# Patient Record
Sex: FEMALE | Race: WHITE | Employment: FULL TIME | RURAL
[De-identification: names, ages, dates, MRNs, and addresses within clinical notes are randomized per-mention and may not be internally consistent; named-entity substitution may affect disease eponyms.]

---

## 2017-01-30 ENCOUNTER — OFFICE VISIT (OUTPATIENT)
Dept: FAMILY MEDICINE CLINIC | Age: 62
End: 2017-01-30

## 2017-01-30 VITALS
HEIGHT: 70 IN | WEIGHT: 161 LBS | HEART RATE: 67 BPM | OXYGEN SATURATION: 97 % | BODY MASS INDEX: 23.05 KG/M2 | DIASTOLIC BLOOD PRESSURE: 80 MMHG | TEMPERATURE: 97.5 F | SYSTOLIC BLOOD PRESSURE: 120 MMHG | RESPIRATION RATE: 16 BRPM

## 2017-01-30 DIAGNOSIS — J01.00 ACUTE NON-RECURRENT MAXILLARY SINUSITIS: Primary | ICD-10-CM

## 2017-01-30 DIAGNOSIS — Z13.31 SCREENING FOR DEPRESSION: ICD-10-CM

## 2017-01-30 RX ORDER — AMOXICILLIN 875 MG/1
875 TABLET, FILM COATED ORAL 2 TIMES DAILY
Qty: 20 TAB | Refills: 0 | Status: SHIPPED | OUTPATIENT
Start: 2017-01-30 | End: 2017-04-10

## 2017-01-30 RX ORDER — PAROXETINE 10 MG/1
TABLET, FILM COATED ORAL
Qty: 30 TAB | Refills: 3 | Status: SHIPPED | OUTPATIENT
Start: 2017-01-30 | End: 2017-05-08 | Stop reason: SDUPTHER

## 2017-01-30 NOTE — PROGRESS NOTES
HISTORY OF PRESENT ILLNESS  Yonas Hannon is a 59 y.o. female. Chief Complaint   Patient presents with    Headache     nasal congestion       HPI  Got worse   Nasal congestion  Itchy throat  Lungs ok  Constant pain in sinuses  Gums swollen and sinus pressure and toothache  headache    Review of Systems   Constitutional: Negative for fever. HENT: Positive for congestion, ear pain (bilat) and sore throat (scratchy). Respiratory: Positive for cough. Negative for sputum production. Cardiovascular: Negative for chest pain. Gastrointestinal: Positive for nausea. Negative for abdominal pain and vomiting. Neurological: Positive for headaches. Past Medical History   Diagnosis Date    Anxiety     Cataract     GERD (gastroesophageal reflux disease)     Glaucoma     HCVD (hypertensive cardiovascular disease)     Mixed hyperlipidemia     Osteoporosis 12/15    Paroxysmal atrial fibrillation (HCC)     RBBB (right bundle branch block)      Current Outpatient Prescriptions   Medication Sig Dispense Refill    amoxicillin (AMOXIL) 875 mg tablet Take 1 Tab by mouth two (2) times a day. 20 Tab 0    atorvastatin (LIPITOR) 10 mg tablet TAKE 1 TABLET BY MOUTH DAILY 30 Tab 5    omeprazole (PRILOSEC) 40 mg capsule Take 1 Cap by mouth daily. 30 Cap 5    PARoxetine (PAXIL) 10 mg tablet TAKE ONE TABLET BY MOUTH DAILY 30 Tab 5    latanoprost (XALATAN) 0.005 % ophthalmic solution       atenolol (TENORMIN) 50 mg tablet Take 1 Tab by mouth two (2) times a day. 60 Tab 12    multivitamin (ONE A DAY) tablet Take 1 Tab by mouth every other day.  Calcium Carbonate-Vit D3-Min (CALTRATE 600+D PLUS MINERALS) 600 mg calcium- 400 unit Tab Take  by mouth every other day.  aspirin 81 mg tablet Take 81 mg by mouth.  omega-3 fatty acids-vitamin e (FISH OIL) 1,000 mg cap Take 1 Cap by mouth daily. (Patient taking differently: Take 1,200 Caps by mouth daily.  Takes 2 daily) 90 Cap 3    cyclobenzaprine (FLEXERIL) 5 mg tablet Take 1 Tab by mouth three (3) times daily as needed for Muscle Spasm(s). 10 Tab 0    ibuprofen (MOTRIN) 200 mg tablet Take  by mouth. Allergies   Allergen Reactions    Ciprofloxacin Anaphylaxis    Betadine [Povidone-Iodine] Other (comments)     Burns her skin    Morphine Other (comments)     Delusions     Visit Vitals    /80 (BP 1 Location: Left arm, BP Patient Position: Sitting)    Pulse 67    Temp 97.5 °F (36.4 °C) (Oral)    Resp 16    Ht 5' 10\" (1.778 m)    Wt 161 lb (73 kg)    SpO2 97%    BMI 23.1 kg/m2       Physical Exam   Constitutional: She is oriented to person, place, and time. She appears well-developed and well-nourished. No distress. HENT:   Head: Normocephalic and atraumatic. Right Ear: External ear normal.   Left Ear: External ear normal.   Mouth/Throat: Oropharynx is clear and moist. No oropharyngeal exudate. Clear mucus in nose, facial tenderness over max sinus   Eyes: Conjunctivae and EOM are normal. Pupils are equal, round, and reactive to light. Cardiovascular: Normal rate and regular rhythm. Pulmonary/Chest: Effort normal and breath sounds normal.   Lymphadenopathy:     She has no cervical adenopathy. Neurological: She is alert and oriented to person, place, and time. Nursing note and vitals reviewed. ASSESSMENT and PLAN    ICD-10-CM ICD-9-CM    1. Acute non-recurrent maxillary sinusitis J01.00 461.0 amoxicillin (AMOXIL) 875 mg tablet   2.  Screening for depression Z13.89 V79.0

## 2017-03-07 ENCOUNTER — TELEPHONE (OUTPATIENT)
Dept: FAMILY MEDICINE CLINIC | Age: 62
End: 2017-03-07

## 2017-03-07 RX ORDER — VALACYCLOVIR HYDROCHLORIDE 1 G/1
TABLET, FILM COATED ORAL
Qty: 20 TAB | Refills: 1 | Status: SHIPPED | OUTPATIENT
Start: 2017-03-07 | End: 2021-03-01 | Stop reason: SDUPTHER

## 2017-03-23 ENCOUNTER — OFFICE VISIT (OUTPATIENT)
Dept: FAMILY MEDICINE CLINIC | Age: 62
End: 2017-03-23

## 2017-03-23 ENCOUNTER — CLINICAL SUPPORT (OUTPATIENT)
Dept: FAMILY MEDICINE CLINIC | Age: 62
End: 2017-03-23

## 2017-03-23 DIAGNOSIS — R68.89 FLU-LIKE SYMPTOMS: Primary | ICD-10-CM

## 2017-03-23 LAB
BINAX NOW INFLUENZA: POSITIVE
QUICKVUE INFLUENZA TEST: POSITIVE
VALID INTERNAL CONTROL?: YES
VALID INTERNAL CONTROL?: YES

## 2017-03-23 NOTE — PROGRESS NOTES
Call into [facility] Tamiflu 75 mg bid for 5 d for patient and Tamiflu 75 mg every day for 10 d for  Ld Alvina

## 2017-03-23 NOTE — PROGRESS NOTES
Per Dr. Anthony Rich, I have called in Tamiflu 75 mg to take BID for 5 days, # 10 called to Cipriano Saleh at the [address] [place] [address].   Sylvia Darling RN

## 2017-04-10 ENCOUNTER — OFFICE VISIT (OUTPATIENT)
Dept: FAMILY MEDICINE CLINIC | Age: 62
End: 2017-04-10

## 2017-04-10 VITALS
SYSTOLIC BLOOD PRESSURE: 132 MMHG | HEART RATE: 78 BPM | DIASTOLIC BLOOD PRESSURE: 88 MMHG | RESPIRATION RATE: 16 BRPM | BODY MASS INDEX: 22.33 KG/M2 | TEMPERATURE: 99.4 F | WEIGHT: 156 LBS | OXYGEN SATURATION: 98 % | HEIGHT: 70 IN

## 2017-04-10 DIAGNOSIS — R68.89 FLU-LIKE SYMPTOMS: ICD-10-CM

## 2017-04-10 DIAGNOSIS — J02.9 SORE THROAT: ICD-10-CM

## 2017-04-10 DIAGNOSIS — J01.01 ACUTE RECURRENT MAXILLARY SINUSITIS: Primary | ICD-10-CM

## 2017-04-10 LAB
BINAX NOW INFLUENZA: NEGATIVE
S PYO AG THROAT QL: NEGATIVE
VALID INTERNAL CONTROL?: YES
VALID INTERNAL CONTROL?: YES

## 2017-04-10 RX ORDER — FLUTICASONE PROPIONATE 50 MCG
SPRAY, SUSPENSION (ML) NASAL
Qty: 1 BOTTLE | Refills: 1 | Status: SHIPPED | OUTPATIENT
Start: 2017-04-10 | End: 2018-10-25 | Stop reason: SDUPTHER

## 2017-04-10 RX ORDER — AMOXICILLIN AND CLAVULANATE POTASSIUM 500; 125 MG/1; MG/1
1 TABLET, FILM COATED ORAL 2 TIMES DAILY
Qty: 20 TAB | Refills: 0 | Status: SHIPPED | OUTPATIENT
Start: 2017-04-10 | End: 2017-04-20

## 2017-04-10 NOTE — PROGRESS NOTES
Tavo Kim is a 59 y.o. female who presents to the office today with the following:  Chief Complaint   Patient presents with    Sore Throat     cough       Allergies   Allergen Reactions    Ciprofloxacin Anaphylaxis    Betadine [Povidone-Iodine] Other (comments)     Burns her skin    Morphine Other (comments)     Delusions       Current Outpatient Prescriptions   Medication Sig    amoxicillin-clavulanate (AUGMENTIN) 500-125 mg per tablet Take 1 Tab by mouth two (2) times a day for 10 days.  fluticasone (FLONASE) 50 mcg/actuation nasal spray 2 puffs each nostril daily    valACYclovir (VALTREX) 1 gram tablet Take 2 tablets bid for 1 d    PARoxetine (PAXIL) 10 mg tablet TAKE ONE TABLET BY MOUTH DAILY    atorvastatin (LIPITOR) 10 mg tablet TAKE 1 TABLET BY MOUTH DAILY    omeprazole (PRILOSEC) 40 mg capsule Take 1 Cap by mouth daily.  cyclobenzaprine (FLEXERIL) 5 mg tablet Take 1 Tab by mouth three (3) times daily as needed for Muscle Spasm(s).  ibuprofen (MOTRIN) 200 mg tablet Take  by mouth.  latanoprost (XALATAN) 0.005 % ophthalmic solution     atenolol (TENORMIN) 50 mg tablet Take 1 Tab by mouth two (2) times a day.  multivitamin (ONE A DAY) tablet Take 1 Tab by mouth every other day.  Calcium Carbonate-Vit D3-Min (CALTRATE 600+D PLUS MINERALS) 600 mg calcium- 400 unit Tab Take  by mouth every other day.  aspirin 81 mg tablet Take 81 mg by mouth.  omega-3 fatty acids-vitamin e (FISH OIL) 1,000 mg cap Take 1 Cap by mouth daily. (Patient taking differently: Take 1,200 Caps by mouth daily. Takes 2 daily)     No current facility-administered medications for this visit.         Past Medical History:   Diagnosis Date    Anxiety     Cataract     GERD (gastroesophageal reflux disease)     Glaucoma     HCVD (hypertensive cardiovascular disease)     Mixed hyperlipidemia     Osteoporosis 12/15    Paroxysmal atrial fibrillation (HCC)     RBBB (right bundle branch block) Past Surgical History:   Procedure Laterality Date    HX BLADDER REPAIR      HX CYST INCISION AND DRAINAGE      tumor/lt    HX GYN      hysterectomy       History   Smoking Status    Current Some Day Smoker    Types: Cigarettes   Smokeless Tobacco    Never Used       Family History   Problem Relation Age of Onset    Diabetes Mother     Heart Disease Father      chf    Cancer Sister      lung ca         History of Present Illness:  Patient here for evaluation sinus complaints  Last week she developed a scratchy throat. Symptoms began to improve. Now over the last 24 hours she complaining of nasal congestion with some maxillary sinus tenderness and upper teeth pain. Postnasal drip. She has a sore throat. Slight cough. Low-grade fever generalized myalgias. Symptoms feel very much like her previous sinusitis infections. She has had these in the past.  She has been seen by ENT in the past but it has been a number of years. She was on Flonase in the past which helped but she ran out. \  Patient does smoke. She has not smoked over the last week. I encouraged her not to start again. As noted she states her cough is mild at this point. Patient gets her routine medical care to Dr. Bernice Chaudhary. Blood pressure currently in good control.   Compliant with her other medications      Review of Systems:    Review of systems negative except as noted above      Physical Exam:  Visit Vitals    /88 (BP 1 Location: Right arm, BP Patient Position: Sitting)    Pulse 78    Temp 99.4 °F (37.4 °C) (Oral)    Resp 16    Ht 5' 10\" (1.778 m)    Wt 156 lb (70.8 kg)    SpO2 98%    BMI 22.38 kg/m2     Vitals:    04/10/17 0723   BP: 132/88   BP 1 Location: Right arm   BP Patient Position: Sitting   Pulse: 78   Resp: 16   Temp: 99.4 °F (37.4 °C)   TempSrc: Oral   SpO2: 98%   Weight: 156 lb (70.8 kg)   Height: 5' 10\" (1.778 m)    Patient no acute distress vitals as above  Head was normocephalic  External ears were normal.  Ear canals normal.  TMs were clear  Nose external ears normal.  No lesions. Minimal clear congestion      Reproducible tenderness maxillary sinuses right greater than left  OP Mucosa normal.  Pharynx normal.  No erythema or exudate. Structures midline  Neck no nodes no masses  Chest had a few coarse breath sounds which cleared with coughing no wheezes rhonchi or rales. Good air exchange  Cor regular rate and rhythm no murmurs      Assessment/Plan:  1. Acute recurrent maxillary sinusitis  We will treat with Augmentin and Flonase. She will follow-up if symptoms persist.  - amoxicillin-clavulanate (AUGMENTIN) 500-125 mg per tablet; Take 1 Tab by mouth two (2) times a day for 10 days. Dispense: 20 Tab; Refill: 0  - fluticasone (FLONASE) 50 mcg/actuation nasal spray; 2 puffs each nostril daily  Dispense: 1 Bottle; Refill: 1    2. Flu-like symptoms  Rapid strep negative  - AMB POC RAPID STREP A    3. Sore throat  Influenza test negative  - AMB POC BINAX NOW INFLUENZA TEST  Patient will otherwise continue her current medications and keep planned routine follow-up        Continue current therapy plan except for indicated above. Verbal and written instructions (see AVS) provided.  Patient expresses understanding of diagnosis and treatment plan. Follow-up Disposition:  Return if symptoms worsen or fail to improve. Richmond Casas.  Rissa Tong MD

## 2017-04-10 NOTE — PATIENT INSTRUCTIONS
Home, rest, lots of fluids, vaporizer if needed. Over the counter cold medications if needed. Follow up if worse or not better next 3-5 days.     Work on your smoking cessation  Notify Dr. Susan Fu if you require any help with this  You may call 1-800-QUIT NOW for additional help and to get nicotine patches

## 2017-05-08 RX ORDER — PAROXETINE 10 MG/1
TABLET, FILM COATED ORAL
Qty: 30 TAB | Refills: 3 | OUTPATIENT
Start: 2017-05-08

## 2017-05-08 RX ORDER — PAROXETINE 10 MG/1
TABLET, FILM COATED ORAL
Qty: 30 TAB | Refills: 1 | Status: SHIPPED | OUTPATIENT
Start: 2017-05-08 | End: 2017-08-09 | Stop reason: SDUPTHER

## 2017-05-26 ENCOUNTER — OFFICE VISIT (OUTPATIENT)
Dept: FAMILY MEDICINE CLINIC | Age: 62
End: 2017-05-26

## 2017-05-26 VITALS
WEIGHT: 152 LBS | HEART RATE: 61 BPM | DIASTOLIC BLOOD PRESSURE: 72 MMHG | OXYGEN SATURATION: 99 % | TEMPERATURE: 97.2 F | BODY MASS INDEX: 21.81 KG/M2 | SYSTOLIC BLOOD PRESSURE: 122 MMHG | RESPIRATION RATE: 18 BRPM

## 2017-05-26 DIAGNOSIS — N30.01 ACUTE CYSTITIS WITH HEMATURIA: Primary | ICD-10-CM

## 2017-05-26 DIAGNOSIS — R30.0 DYSURIA: ICD-10-CM

## 2017-05-26 LAB
BILIRUB UR QL STRIP: NEGATIVE
GLUCOSE UR-MCNC: NEGATIVE MG/DL
KETONES P FAST UR STRIP-MCNC: NEGATIVE MG/DL
PH UR STRIP: 7.5 [PH] (ref 4.6–8)
PROT UR QL STRIP: NORMAL MG/DL
SP GR UR STRIP: 1.01 (ref 1–1.03)
UA UROBILINOGEN AMB POC: NORMAL (ref 0.2–1)
URINALYSIS CLARITY POC: CLEAR
URINALYSIS COLOR POC: YELLOW
URINE BLOOD POC: NORMAL
URINE LEUKOCYTES POC: NORMAL
URINE NITRITES POC: POSITIVE

## 2017-05-26 RX ORDER — PHENAZOPYRIDINE HYDROCHLORIDE 200 MG/1
200 TABLET, FILM COATED ORAL
Qty: 10 TAB | Refills: 0 | Status: SHIPPED | OUTPATIENT
Start: 2017-05-26 | End: 2017-05-28

## 2017-05-26 RX ORDER — SULFAMETHOXAZOLE AND TRIMETHOPRIM 800; 160 MG/1; MG/1
1 TABLET ORAL 2 TIMES DAILY
Qty: 10 TAB | Refills: 0 | Status: SHIPPED | OUTPATIENT
Start: 2017-05-26 | End: 2017-05-31

## 2017-05-26 NOTE — PATIENT INSTRUCTIONS
Urinary Tract Infection in Women: Care Instructions  Your Care Instructions    A urinary tract infection, or UTI, is a general term for an infection anywhere between the kidneys and the urethra (where urine comes out). Most UTIs are bladder infections. They often cause pain or burning when you urinate. UTIs are caused by bacteria and can be cured with antibiotics. Be sure to complete your treatment so that the infection goes away. Follow-up care is a key part of your treatment and safety. Be sure to make and go to all appointments, and call your doctor if you are having problems. It's also a good idea to know your test results and keep a list of the medicines you take. How can you care for yourself at home? · Take your antibiotics as directed. Do not stop taking them just because you feel better. You need to take the full course of antibiotics. · Drink extra water and other fluids for the next day or two. This may help wash out the bacteria that are causing the infection. (If you have kidney, heart, or liver disease and have to limit fluids, talk with your doctor before you increase your fluid intake.)  · Avoid drinks that are carbonated or have caffeine. They can irritate the bladder. · Urinate often. Try to empty your bladder each time. · To relieve pain, take a hot bath or lay a heating pad set on low over your lower belly or genital area. Never go to sleep with a heating pad in place. To prevent UTIs  · Drink plenty of water each day. This helps you urinate often, which clears bacteria from your system. (If you have kidney, heart, or liver disease and have to limit fluids, talk with your doctor before you increase your fluid intake.)  · Urinate when you need to. · Urinate right after you have sex. · Change sanitary pads often. · Avoid douches, bubble baths, feminine hygiene sprays, and other feminine hygiene products that have deodorants.   · After going to the bathroom, wipe from front to back.  When should you call for help? Call your doctor now or seek immediate medical care if:  · Symptoms such as fever, chills, nausea, or vomiting get worse or appear for the first time. · You have new pain in your back just below your rib cage. This is called flank pain. · There is new blood or pus in your urine. · You have any problems with your antibiotic medicine. Watch closely for changes in your health, and be sure to contact your doctor if:  · You are not getting better after taking an antibiotic for 2 days. · Your symptoms go away but then come back. Where can you learn more? Go to http://alex-vamsi.info/. Enter S107 in the search box to learn more about \"Urinary Tract Infection in Women: Care Instructions. \"  Current as of: November 28, 2016  Content Version: 11.2  © 5283-9561 [employer], [employer]. Care instructions adapted under license by [employer] (which disclaims liability or warranty for this information). If you have questions about a medical condition or this instruction, always ask your healthcare professional. [address] any warranty or liability for your use of this information.

## 2017-05-26 NOTE — PROGRESS NOTES
Karol Cuellar is a 57 y.o. female who presents to the office today with the following:  Chief Complaint   Patient presents with    Bladder Infection     s/s uti, hx uti       HPI  Pt c/o UTI sxs x 1 day. Started burning yesterday, also with urgency/frequency. Urine appears slightly cloudy, but no blood. Otherwise feeling well. Denies f/c, n/v/d, abd pain, back pain. Review of Systems   Constitutional: Negative for chills, fever and malaise/fatigue. Respiratory: Negative for shortness of breath. Cardiovascular: Negative for chest pain. Gastrointestinal: Negative. Negative for abdominal pain, diarrhea, nausea and vomiting. Genitourinary: Positive for dysuria, frequency and urgency. Negative for flank pain and hematuria. Musculoskeletal: Negative for myalgias. Skin: Negative for rash. See HPI. Allergies   Allergen Reactions    Ciprofloxacin Anaphylaxis    Betadine [Povidone-Iodine] Other (comments)     Burns her skin    Morphine Other (comments)     Delusions       Current Outpatient Prescriptions   Medication Sig    trimethoprim-sulfamethoxazole (BACTRIM DS, SEPTRA DS) 160-800 mg per tablet Take 1 Tab by mouth two (2) times a day for 5 days.  phenazopyridine (PYRIDIUM) 200 mg tablet Take 1 Tab by mouth three (3) times daily as needed for Pain for up to 2 days.  PARoxetine (PAXIL) 10 mg tablet TAKE ONE TABLET BY MOUTH DAILY    atorvastatin (LIPITOR) 10 mg tablet TAKE 1 TABLET BY MOUTH DAILY    omeprazole (PRILOSEC) 40 mg capsule Take 1 Cap by mouth daily.  ibuprofen (MOTRIN) 200 mg tablet Take  by mouth.  latanoprost (XALATAN) 0.005 % ophthalmic solution     atenolol (TENORMIN) 50 mg tablet Take 1 Tab by mouth two (2) times a day.  multivitamin (ONE A DAY) tablet Take 1 Tab by mouth every other day.  Calcium Carbonate-Vit D3-Min (CALTRATE 600+D PLUS MINERALS) 600 mg calcium- 400 unit Tab Take  by mouth every other day.     aspirin 81 mg tablet Take 81 mg by mouth.    omega-3 fatty acids-vitamin e (FISH OIL) 1,000 mg cap Take 1 Cap by mouth daily. (Patient taking differently: Take 1,200 Caps by mouth daily. Takes 2 daily)    fluticasone (FLONASE) 50 mcg/actuation nasal spray 2 puffs each nostril daily    valACYclovir (VALTREX) 1 gram tablet Take 2 tablets bid for 1 d    cyclobenzaprine (FLEXERIL) 5 mg tablet Take 1 Tab by mouth three (3) times daily as needed for Muscle Spasm(s). No current facility-administered medications for this visit. Past Medical History:   Diagnosis Date    Anxiety     Cataract     GERD (gastroesophageal reflux disease)     Glaucoma     HCVD (hypertensive cardiovascular disease)     Mixed hyperlipidemia     Osteoporosis 12/15    Paroxysmal atrial fibrillation (HCC)     RBBB (right bundle branch block)        Past Surgical History:   Procedure Laterality Date    HX BLADDER REPAIR      HX CYST INCISION AND DRAINAGE      tumor/lt    HX GYN      hysterectomy       Social History     Social History    Marital status:      Spouse name: N/A    Number of children: N/A    Years of education: N/A     Social History Main Topics    Smoking status: Current Some Day Smoker     Types: Cigarettes    Smokeless tobacco: Never Used    Alcohol use Yes      Comment: Occasional    Drug use: No    Sexual activity: Yes     Partners: Male     Other Topics Concern    None     Social History Narrative       Family History   Problem Relation Age of Onset    Diabetes Mother     Heart Disease Father      chf    Cancer Sister      lung ca         Physical Exam:  Visit Vitals    /72 (BP 1 Location: Left arm, BP Patient Position: Sitting)    Pulse 61    Temp 97.2 °F (36.2 °C) (Oral)    Resp 18    Wt 152 lb (68.9 kg)    SpO2 99%    BMI 21.81 kg/m2     Physical Exam   Constitutional: She is oriented to person, place, and time and well-developed, well-nourished, and in no distress.    HENT:   Head: Normocephalic and atraumatic. Eyes: Conjunctivae are normal.   Neck: Normal range of motion. Neck supple. Cardiovascular: Normal rate and regular rhythm. Pulmonary/Chest: Effort normal and breath sounds normal.   Abdominal: Soft. Normal appearance and bowel sounds are normal. She exhibits no distension and no mass. There is no hepatosplenomegaly. There is no tenderness. There is no rigidity, no rebound, no guarding, no CVA tenderness, no tenderness at McBurney's point and negative Croft's sign. Neurological: She is alert and oriented to person, place, and time. Gait normal.   Skin: Skin is warm and dry. Psychiatric: Mood and affect normal.   Nursing note and vitals reviewed. Assessment/Plan:    ICD-10-CM ICD-9-CM    1. Acute cystitis with hematuria N30.01 595.0 CULTURE, URINE      trimethoprim-sulfamethoxazole (BACTRIM DS, SEPTRA DS) 160-800 mg per tablet      phenazopyridine (PYRIDIUM) 200 mg tablet   2. Dysuria R30.0 788.1 AMB POC URINALYSIS DIP STICK MANUAL W/O MICRO      phenazopyridine (PYRIDIUM) 200 mg tablet     Results for orders placed or performed in visit on 05/26/17   AMB POC URINALYSIS DIP STICK MANUAL W/O MICRO   Result Value Ref Range    Color (UA POC) Yellow     Clarity (UA POC) Clear     Glucose (UA POC) Negative Negative    Bilirubin (UA POC) Negative Negative    Ketones (UA POC) Negative Negative    Specific gravity (UA POC) 1.015 1.001 - 1.035    Blood (UA POC) Trace Negative    pH (UA POC) 7.5 4.6 - 8.0    Protein (UA POC) Trace Negative mg/dL    Urobilinogen (UA POC) 0.2 mg/dL 0.2 - 1    Nitrites (UA POC) Positive Negative    Leukocyte esterase (UA POC) 1+ Negative     Instructed to push fluids (ie water/cranberry juice). Avoid caffeine and carbonated bvgs. Counseled pt on potential medication AEs/interactions. Counseled on otc medications for sxs relief- prefers Rx, but if too expensive may try AZO instead. To limit use 2-3 d only.   Educational handout provided with home care instructions. Instructed to RTO/seek medical attn if sxs persist/worsen. Follow-up Disposition:  Return if symptoms worsen or fail to improve.     Helga Anne PA-C

## 2017-05-26 NOTE — PROGRESS NOTES
Chief Complaint   Patient presents with    Bladder Infection     s/s uti, hx uti     Health Maintenance Due   Topic Date Due    Pneumococcal 19-64 Highest Risk (1 of 3 - PCV13) 05/18/1972 pt. declines    DTaP/Tdap/Td series (1 - Tdap) 05/18/1974     PAP AKA CERVICAL CYTOLOGY  05/18/1974 sees Dr. Siobhan Jacobs FOBT Q 1 YEAR AGE 50-75  05/18/2003 declines    ZOSTER VACCINE AGE 60>  05/18/2013 aware     Blood pressure 122/72, pulse 61, temperature 97.2 °F (36.2 °C), temperature source Oral, resp. rate 18, weight 152 lb (68.9 kg), SpO2 99 %.     Radha Beverage, LPN

## 2017-05-29 LAB — BACTERIA UR CULT: ABNORMAL

## 2017-05-30 NOTE — PROGRESS NOTES
Pt notified, Bactrim should work. Sxs improved but thinks now has a yeast infection with some itching. Will try some otc remedies and notify if no improvement and may send some Diflucan.

## 2017-06-02 ENCOUNTER — TELEPHONE (OUTPATIENT)
Dept: FAMILY MEDICINE CLINIC | Age: 62
End: 2017-06-02

## 2017-06-02 DIAGNOSIS — N39.0 URINARY TRACT INFECTION WITH HEMATURIA, SITE UNSPECIFIED: Primary | ICD-10-CM

## 2017-06-02 DIAGNOSIS — R31.9 URINARY TRACT INFECTION WITH HEMATURIA, SITE UNSPECIFIED: Primary | ICD-10-CM

## 2017-06-02 RX ORDER — SULFAMETHOXAZOLE AND TRIMETHOPRIM 800; 160 MG/1; MG/1
1 TABLET ORAL 2 TIMES DAILY
Qty: 10 TAB | Refills: 0 | Status: SHIPPED | OUTPATIENT
Start: 2017-06-02 | End: 2017-06-07

## 2017-06-02 NOTE — TELEPHONE ENCOUNTER
Pt says thought sxs were better so stopped abx after 3 days. Had nurse check her urine and had trace amounts of leuk still. Would like refill called in, otherwise is asxs.

## 2017-06-09 ENCOUNTER — CLINICAL SUPPORT (OUTPATIENT)
Dept: FAMILY MEDICINE CLINIC | Age: 62
End: 2017-06-09

## 2017-06-09 DIAGNOSIS — R82.90 ABNORMAL URINE FINDING: Primary | ICD-10-CM

## 2017-06-09 DIAGNOSIS — Z87.440 H/O URINARY INFECTION: ICD-10-CM

## 2017-06-10 LAB
APPEARANCE UR: CLEAR
BACTERIA #/AREA URNS HPF: ABNORMAL /[HPF]
BILIRUB UR QL STRIP: NEGATIVE
CASTS URNS QL MICRO: ABNORMAL /LPF
COLOR UR: YELLOW
CRYSTALS URNS MICRO: ABNORMAL
EPI CELLS #/AREA URNS HPF: ABNORMAL /HPF
GLUCOSE UR QL: NEGATIVE
HGB UR QL STRIP: ABNORMAL
KETONES UR QL STRIP: NEGATIVE
LEUKOCYTE ESTERASE UR QL STRIP: ABNORMAL
MICRO URNS: ABNORMAL
MUCOUS THREADS URNS QL MICRO: PRESENT
NITRITE UR QL STRIP: NEGATIVE
PH UR STRIP: 5.5 [PH] (ref 5–7.5)
PROT UR QL STRIP: ABNORMAL
RBC #/AREA URNS HPF: ABNORMAL /HPF
SP GR UR: 1.02 (ref 1–1.03)
UNIDENT CRYS URNS QL MICRO: PRESENT
UROBILINOGEN UR STRIP-MCNC: 0.2 MG/DL (ref 0.2–1)
WBC #/AREA URNS HPF: ABNORMAL /HPF

## 2017-06-12 NOTE — PROGRESS NOTES
Discussed with pt, admits to hx kidney stones. Currently feeling well. Encouraged to check in to be seen for repeat testing/culture if sxs return. Otherwise keep reg f/u with PCP. Recheck urine in 6 weeks.

## 2017-06-22 ENCOUNTER — OFFICE VISIT (OUTPATIENT)
Dept: FAMILY MEDICINE CLINIC | Age: 62
End: 2017-06-22

## 2017-06-22 DIAGNOSIS — Z85.828 HISTORY OF BASAL CELL CANCER: ICD-10-CM

## 2017-06-22 DIAGNOSIS — L01.00 IMPETIGO: Primary | ICD-10-CM

## 2017-06-23 VITALS
RESPIRATION RATE: 20 BRPM | DIASTOLIC BLOOD PRESSURE: 70 MMHG | SYSTOLIC BLOOD PRESSURE: 110 MMHG | TEMPERATURE: 97 F | HEART RATE: 60 BPM | BODY MASS INDEX: 21.52 KG/M2 | WEIGHT: 150 LBS

## 2017-07-20 RX ORDER — ATORVASTATIN CALCIUM 10 MG/1
TABLET, FILM COATED ORAL
Qty: 30 TAB | Refills: 0 | Status: SHIPPED | OUTPATIENT
Start: 2017-07-20 | End: 2017-10-06 | Stop reason: SDUPTHER

## 2017-07-26 NOTE — TELEPHONE ENCOUNTER
I have called and spoken to patient, adv'd her that #30 days have been approved for her cholesterol med.s, she will need to have her cholesterol rechecked. Patient verbalizes understanding and will be in office on Friday to have her blood drawn.   Sherlyn Lane RN

## 2017-08-02 ENCOUNTER — OFFICE VISIT (OUTPATIENT)
Dept: FAMILY MEDICINE CLINIC | Age: 62
End: 2017-08-02

## 2017-08-02 VITALS
HEART RATE: 66 BPM | TEMPERATURE: 96.2 F | DIASTOLIC BLOOD PRESSURE: 72 MMHG | RESPIRATION RATE: 20 BRPM | SYSTOLIC BLOOD PRESSURE: 100 MMHG | WEIGHT: 150 LBS | BODY MASS INDEX: 21.52 KG/M2 | OXYGEN SATURATION: 98 %

## 2017-08-02 DIAGNOSIS — Z87.448 HX OF HEMATURIA: ICD-10-CM

## 2017-08-02 DIAGNOSIS — E78.5 HYPERLIPIDEMIA, UNSPECIFIED HYPERLIPIDEMIA TYPE: ICD-10-CM

## 2017-08-02 DIAGNOSIS — Z12.11 COLON CANCER SCREENING: ICD-10-CM

## 2017-08-02 DIAGNOSIS — R07.9 CHEST PAIN, UNSPECIFIED TYPE: ICD-10-CM

## 2017-08-02 DIAGNOSIS — I10 ESSENTIAL HYPERTENSION: Primary | ICD-10-CM

## 2017-08-02 DIAGNOSIS — R00.2 HEART PALPITATIONS: ICD-10-CM

## 2017-08-02 DIAGNOSIS — R94.31 ABNORMAL EKG: ICD-10-CM

## 2017-08-02 LAB
BILIRUB UR QL STRIP: NEGATIVE
GLUCOSE UR-MCNC: NEGATIVE MG/DL
KETONES P FAST UR STRIP-MCNC: NEGATIVE MG/DL
PH UR STRIP: 7 [PH] (ref 4.6–8)
PROT UR QL STRIP: NORMAL MG/DL
SP GR UR STRIP: 1.02 (ref 1–1.03)
UA UROBILINOGEN AMB POC: NORMAL (ref 0.2–1)
URINALYSIS CLARITY POC: CLEAR
URINALYSIS COLOR POC: NORMAL
URINE BLOOD POC: NORMAL
URINE LEUKOCYTES POC: NEGATIVE
URINE NITRITES POC: NEGATIVE

## 2017-08-02 NOTE — PROGRESS NOTES
HISTORY OF PRESENT ILLNESS  Sonia Lemons is a 57 y.o. female. Chief Complaint   Patient presents with    Medication Refill       HPI  HTN  No SE w meds    Hyperlipidemia  Fasting  No SE with meds    Had blood in urine in the past  Never had work up   But had renal stones and crystals    HM reviewed    Doing well on Paxil    Still smoking    Later in am pt c/o of chest pain and palpitations  Had Stress test at [facility] few years ago    Review of Systems   Constitutional: Negative for fever and weight loss. Respiratory: Negative for cough and shortness of breath. Cardiovascular: Negative for chest pain. Gastrointestinal: Negative for blood in stool. Genitourinary: Negative for hematuria. Musculoskeletal: Positive for joint pain and myalgias (achyness). Neurological: Negative for dizziness and headaches. Past Medical History:   Diagnosis Date    Anxiety     Cataract     GERD (gastroesophageal reflux disease)     Glaucoma     HCVD (hypertensive cardiovascular disease)     Mixed hyperlipidemia     Osteoporosis 12/15    Paroxysmal atrial fibrillation (HCC)     RBBB (right bundle branch block)     Renal stones      Past Surgical History:   Procedure Laterality Date    HX BLADDER REPAIR      HX COLONOSCOPY      q5-7 y    HX CYST INCISION AND DRAINAGE      tumor/lt    HX GYN      hysterectomy     Allergies   Allergen Reactions    Ciprofloxacin Anaphylaxis    Betadine [Povidone-Iodine] Other (comments)     Burns her skin    Morphine Other (comments)     Delusions     Visit Vitals    /72 (BP 1 Location: Right arm, BP Patient Position: Sitting)    Pulse 66    Temp 96.2 °F (35.7 °C) (Oral)    Resp 20    Wt 150 lb (68 kg)    SpO2 98%    BMI 21.52 kg/m2     Physical Exam   Constitutional: She is oriented to person, place, and time. She appears well-developed and well-nourished. No distress. HENT:   Head: Normocephalic and atraumatic.    Right Ear: External ear normal. Left Ear: External ear normal.   Mouth/Throat: Oropharynx is clear and moist. No oropharyngeal exudate. Eyes: Conjunctivae are normal. Pupils are equal, round, and reactive to light. Cardiovascular: Normal rate, regular rhythm, normal heart sounds and intact distal pulses. Pulmonary/Chest: Effort normal and breath sounds normal.   Abdominal: Soft. Bowel sounds are normal. There is tenderness (left low quadrant and suprapubic). Musculoskeletal: She exhibits no edema. Lymphadenopathy:     She has no cervical adenopathy. Neurological: She is alert and oriented to person, place, and time. Skin: Skin is warm and dry. Psychiatric: She has a normal mood and affect. Nursing note and vitals reviewed. Recent Results (from the past 12 hour(s))   AMB POC URINALYSIS DIP STICK MANUAL W/O MICRO    Collection Time: 08/02/17  8:16 AM   Result Value Ref Range    Color (UA POC) Dark Yellow     Clarity (UA POC) Clear     Glucose (UA POC) Negative Negative    Bilirubin (UA POC) Negative Negative    Ketones (UA POC) Negative Negative    Specific gravity (UA POC) 1.020 1.001 - 1.035    Blood (UA POC) Trace Negative    pH (UA POC) 7.0 4.6 - 8.0    Protein (UA POC) 1+ Negative mg/dL    Urobilinogen (UA POC) 0.2 mg/dL 0.2 - 1    Nitrites (UA POC) Negative Negative    Leukocyte esterase (UA POC) Negative Negative     EKG abnormal compared with prev  Showing poss ant ischemia    ASSESSMENT and PLAN    ICD-10-CM ICD-9-CM    1. Essential hypertension I10 401.9 CBC WITH AUTOMATED DIFF      METABOLIC PANEL, COMPREHENSIVE   2. Hyperlipidemia, unspecified hyperlipidemia type E78.5 272.4 LIPID PANEL WITH LDL/HDL RATIO   3. Colon cancer screening Z12.11 V76.51 OCCULT BLOOD, IMMUNOASSAY (FIT)   4. Hx of hematuria Z87.448 V13.09 AMB POC URINALYSIS DIP STICK MANUAL W/O MICRO   5.  Heart palpitations R00.2 785.1 AMB POC EKG ROUTINE W/ 12 LEADS, INTER & REP      NM CARDIAC SPECT W STRS/REST MULT

## 2017-08-02 NOTE — PROGRESS NOTES
HISTORY OF PRESENT ILLNESS  Rosa Garland is a 57 y.o. female. Chief Complaint   Patient presents with    Medication Refill       HPI    ROS  Past Medical History:   Diagnosis Date    Anxiety     Cataract     GERD (gastroesophageal reflux disease)     Glaucoma     HCVD (hypertensive cardiovascular disease)     Mixed hyperlipidemia     Osteoporosis 12/15    Paroxysmal atrial fibrillation (HCC)     RBBB (right bundle branch block)      Current Outpatient Prescriptions   Medication Sig Dispense Refill    atorvastatin (LIPITOR) 10 mg tablet TAKE 1 TABLET BY MOUTH DAILY 30 Tab 0    PARoxetine (PAXIL) 10 mg tablet TAKE ONE TABLET BY MOUTH DAILY 30 Tab 1    fluticasone (FLONASE) 50 mcg/actuation nasal spray 2 puffs each nostril daily 1 Bottle 1    valACYclovir (VALTREX) 1 gram tablet Take 2 tablets bid for 1 d 20 Tab 1    omeprazole (PRILOSEC) 40 mg capsule Take 1 Cap by mouth daily. 30 Cap 5    cyclobenzaprine (FLEXERIL) 5 mg tablet Take 1 Tab by mouth three (3) times daily as needed for Muscle Spasm(s). 10 Tab 0    ibuprofen (MOTRIN) 200 mg tablet Take  by mouth.  latanoprost (XALATAN) 0.005 % ophthalmic solution       atenolol (TENORMIN) 50 mg tablet Take 1 Tab by mouth two (2) times a day. 60 Tab 12    multivitamin (ONE A DAY) tablet Take 1 Tab by mouth every other day.  Calcium Carbonate-Vit D3-Min (CALTRATE 600+D PLUS MINERALS) 600 mg calcium- 400 unit Tab Take  by mouth every other day.  aspirin 81 mg tablet Take 81 mg by mouth.  omega-3 fatty acids-vitamin e (FISH OIL) 1,000 mg cap Take 1 Cap by mouth daily. (Patient taking differently: Take 1,200 Caps by mouth daily.  Takes 2 daily) 90 Cap 3     Allergies   Allergen Reactions    Ciprofloxacin Anaphylaxis    Betadine [Povidone-Iodine] Other (comments)     Burns her skin    Morphine Other (comments)     Delusions     Visit Vitals    /90 (BP 1 Location: Right arm, BP Patient Position: Sitting)    Pulse 66    Temp 96.2 °F (35.7 °C) (Oral)    Resp 20    Wt 150 lb (68 kg)    SpO2 98%    BMI 21.52 kg/m2     Physical Exam    ASSESSMENT and PLAN  {No Diagnosis Found}

## 2017-08-03 ENCOUNTER — TELEPHONE (OUTPATIENT)
Dept: FAMILY MEDICINE CLINIC | Age: 62
End: 2017-08-03

## 2017-08-03 LAB
ALBUMIN SERPL-MCNC: 4.6 G/DL (ref 3.6–4.8)
ALBUMIN/GLOB SERPL: 1.9 {RATIO} (ref 1.2–2.2)
ALP SERPL-CCNC: 89 IU/L (ref 39–117)
ALT SERPL-CCNC: 20 IU/L (ref 0–32)
AST SERPL-CCNC: 27 IU/L (ref 0–40)
BASOPHILS # BLD AUTO: 0.1 X10E3/UL (ref 0–0.2)
BASOPHILS NFR BLD AUTO: 2 %
BILIRUB SERPL-MCNC: 0.4 MG/DL (ref 0–1.2)
BUN SERPL-MCNC: 14 MG/DL (ref 8–27)
BUN/CREAT SERPL: 16 (ref 12–28)
CALCIUM SERPL-MCNC: 10 MG/DL (ref 8.7–10.3)
CHLORIDE SERPL-SCNC: 101 MMOL/L (ref 96–106)
CHOLEST SERPL-MCNC: 169 MG/DL (ref 100–199)
CO2 SERPL-SCNC: 26 MMOL/L (ref 18–29)
CREAT SERPL-MCNC: 0.85 MG/DL (ref 0.57–1)
EOSINOPHIL # BLD AUTO: 0.4 X10E3/UL (ref 0–0.4)
EOSINOPHIL NFR BLD AUTO: 7 %
ERYTHROCYTE [DISTWIDTH] IN BLOOD BY AUTOMATED COUNT: 13 % (ref 12.3–15.4)
GLOBULIN SER CALC-MCNC: 2.4 G/DL (ref 1.5–4.5)
GLUCOSE SERPL-MCNC: 101 MG/DL (ref 65–99)
HCT VFR BLD AUTO: 42.4 % (ref 34–46.6)
HDLC SERPL-MCNC: 61 MG/DL
HGB BLD-MCNC: 14.2 G/DL (ref 11.1–15.9)
IMM GRANULOCYTES # BLD: 0 X10E3/UL (ref 0–0.1)
IMM GRANULOCYTES NFR BLD: 0 %
INTERPRETATION, 910389: NORMAL
LDLC SERPL CALC-MCNC: 79 MG/DL (ref 0–99)
LDLC/HDLC SERPL: 1.3 RATIO UNITS (ref 0–3.2)
LYMPHOCYTES # BLD AUTO: 2.3 X10E3/UL (ref 0.7–3.1)
LYMPHOCYTES NFR BLD AUTO: 34 %
MCH RBC QN AUTO: 31.8 PG (ref 26.6–33)
MCHC RBC AUTO-ENTMCNC: 33.5 G/DL (ref 31.5–35.7)
MCV RBC AUTO: 95 FL (ref 79–97)
MONOCYTES # BLD AUTO: 0.5 X10E3/UL (ref 0.1–0.9)
MONOCYTES NFR BLD AUTO: 7 %
NEUTROPHILS # BLD AUTO: 3.4 X10E3/UL (ref 1.4–7)
NEUTROPHILS NFR BLD AUTO: 50 %
PLATELET # BLD AUTO: 355 X10E3/UL (ref 150–379)
POTASSIUM SERPL-SCNC: 4.5 MMOL/L (ref 3.5–5.2)
PROT SERPL-MCNC: 7 G/DL (ref 6–8.5)
RBC # BLD AUTO: 4.46 X10E6/UL (ref 3.77–5.28)
SODIUM SERPL-SCNC: 143 MMOL/L (ref 134–144)
TRIGL SERPL-MCNC: 147 MG/DL (ref 0–149)
VLDLC SERPL CALC-MCNC: 29 MG/DL (ref 5–40)
WBC # BLD AUTO: 6.7 X10E3/UL (ref 3.4–10.8)

## 2017-08-03 NOTE — TELEPHONE ENCOUNTER
Mali  From [facility] central scheduling calls requesting a prior authorization for [employer]	[place] nuclear med stress test. This needs to be faxed or received by central scheduling by 2.pm. Today or test will be cancelled.  YOu can call Rae Ashley at

## 2017-08-03 NOTE — PROGRESS NOTES
Notify pt, the CBC is normal  The Chol is great  The kidney and liver tests are normal now  The sugar is ok  Cont current meds and recheck in 6 mo

## 2017-08-03 NOTE — TELEPHONE ENCOUNTER
Called and left message, when I scheduled this test yesterday called patients insurance [address] no Durward Gowers is required  [facility] called this am and said if no auth test would be cancelled and Chad Linn is needed

## 2017-08-05 LAB — HEMOCCULT STL QL IA: NEGATIVE

## 2017-08-09 RX ORDER — PAROXETINE 10 MG/1
TABLET, FILM COATED ORAL
Qty: 30 TAB | Refills: 0 | Status: SHIPPED | OUTPATIENT
Start: 2017-08-09 | End: 2017-09-11 | Stop reason: SDUPTHER

## 2017-09-08 ENCOUNTER — OFFICE VISIT (OUTPATIENT)
Dept: CARDIOLOGY CLINIC | Age: 62
End: 2017-09-08

## 2017-09-08 VITALS
SYSTOLIC BLOOD PRESSURE: 132 MMHG | HEART RATE: 62 BPM | DIASTOLIC BLOOD PRESSURE: 84 MMHG | BODY MASS INDEX: 21.62 KG/M2 | WEIGHT: 151 LBS | HEIGHT: 70 IN | OXYGEN SATURATION: 100 % | RESPIRATION RATE: 18 BRPM

## 2017-09-08 DIAGNOSIS — I10 ESSENTIAL HYPERTENSION: ICD-10-CM

## 2017-09-08 DIAGNOSIS — R42 LIGHTHEADEDNESS: ICD-10-CM

## 2017-09-08 DIAGNOSIS — R00.2 PALPITATIONS: ICD-10-CM

## 2017-09-08 DIAGNOSIS — I45.10 RBBB (RIGHT BUNDLE BRANCH BLOCK): ICD-10-CM

## 2017-09-08 DIAGNOSIS — I49.1 PAC (PREMATURE ATRIAL CONTRACTION): Primary | ICD-10-CM

## 2017-09-08 NOTE — PROGRESS NOTES
Charlee Vasquez is a 57 y.o. female is here for cardiac evaluation. Hx chest pain, palpitations, hypertension, PSVT, RBBB, anxiety, PAF (CHADs 0, ASA only). Recent sx of increased palpitations, intermittent CP. Works at [employer]. Seen by PCP, had Lexiscan MPI 8/4/17--no ischemia/infarct, LVEF 70%. Labs 8/2/17 with CBC, CMP ok. The patient denies  shortness of breath, orthopnea, PND, LE edema,  syncope, presyncope or fatigue. Patient Active Problem List    Diagnosis Date Noted    RBBB (right bundle branch block)     Paroxysmal atrial fibrillation (HCC)     HCVD (hypertensive cardiovascular disease)     Mixed hyperlipidemia     Diverticulosis of colon 11/02/2013    Anxiety 03/18/2013    Esophageal reflux 11/27/2012      Tamiko Fraire MD  Past Medical History:   Diagnosis Date    Anxiety     Cataract     GERD (gastroesophageal reflux disease)     Glaucoma     HCVD (hypertensive cardiovascular disease)     Mixed hyperlipidemia     Osteoporosis 12/15    Paroxysmal atrial fibrillation (HCC)     RBBB (right bundle branch block)     Renal stones       Past Surgical History:   Procedure Laterality Date    HX BLADDER REPAIR      HX COLONOSCOPY      q5-7 y    HX CYST INCISION AND DRAINAGE      tumor/lt    HX GYN      hysterectomy     Allergies   Allergen Reactions    Ciprofloxacin Anaphylaxis    Betadine [Povidone-Iodine] Other (comments)     Burns her skin    Morphine Other (comments)     Delusions      Family History   Problem Relation Age of Onset    Diabetes Mother     Heart Disease Father      chf    Cancer Sister      lung ca      Social History     Social History    Marital status:      Spouse name: N/A    Number of children: N/A    Years of education: N/A     Occupational History    Not on file.      Social History Main Topics    Smoking status: Current Some Day Smoker     Types: Cigarettes    Smokeless tobacco: Never Used    Alcohol use Yes      Comment: Occasional    Drug use: No    Sexual activity: Yes     Partners: Male     Other Topics Concern    Not on file     Social History Narrative      Current Outpatient Prescriptions   Medication Sig    PARoxetine (PAXIL) 10 mg tablet TAKE ONE TABLET BY MOUTH DAILY    atorvastatin (LIPITOR) 10 mg tablet TAKE 1 TABLET BY MOUTH DAILY    omeprazole (PRILOSEC) 40 mg capsule Take 1 Cap by mouth daily.  ibuprofen (MOTRIN) 200 mg tablet Take  by mouth.  latanoprost (XALATAN) 0.005 % ophthalmic solution     atenolol (TENORMIN) 50 mg tablet Take 1 Tab by mouth two (2) times a day.  multivitamin (ONE A DAY) tablet Take 1 Tab by mouth every other day.  Calcium Carbonate-Vit D3-Min (CALTRATE 600+D PLUS MINERALS) 600 mg calcium- 400 unit Tab Take  by mouth every other day.  aspirin 81 mg tablet Take 81 mg by mouth.  omega-3 fatty acids-vitamin e (FISH OIL) 1,000 mg cap Take 1 Cap by mouth daily. (Patient taking differently: Take 1,200 Caps by mouth daily. Takes 2 daily)    fluticasone (FLONASE) 50 mcg/actuation nasal spray 2 puffs each nostril daily    valACYclovir (VALTREX) 1 gram tablet Take 2 tablets bid for 1 d     No current facility-administered medications for this visit. Review of Symptoms:    CONST  No weight change. No fever, chills, sweats    ENT No visual changes, URI sx, sore throat    CV  See HPI   RESP  No cough, or sputum, wheezing. Also see HPI   GI  No abdominal pain or change in bowel habits. No heartburn or dysphagia. No melena or rectal bleeding.   No dysuria, urgency, frequency, hematuria   MSKEL  No joint pain, swelling. No muscle pain. SKIN  No rash or lesions. NEURO  No headache, syncope, or seizure. No weakness, loss of sensation, or paresthesias. PSYCH  No low mood or depression  No anxiety. HE/LYMPH  No easy bruising, abnormal bleeding, or enlarged glands.         Physical ExamPhysical Exam:    Visit Vitals    /84 (BP 1 Location: Left arm, BP Patient Position: Sitting)    Pulse 62    Resp 18    Ht 5' 10\" (1.778 m)    Wt 151 lb (68.5 kg)    SpO2 100%    BMI 21.67 kg/m2     Gen: NAD  HEENT:  PERRL, throat clear  Neck: no adenopathy, no thyromegaly, no JVD   Heart:  Regular,Nl S1S2,  no murmur, gallop or rub.   Lungs:  clear  Abdomen:   Soft, non-tender, bowel sounds are active.   Extremities:  No edema  Pulse: symmetric  Neuro: A&O times 3, No focal neuro deficits    Cardiographics    ECG: NSR, RBBB      Labs:   Lab Results   Component Value Date/Time    Sodium 143 08/02/2017 08:08 AM    Sodium 144 12/19/2016 06:12 PM    Sodium 140 04/27/2016 02:06 PM    Sodium 141 10/30/2015 07:19 AM    Sodium 141 02/18/2015 11:20 AM    Potassium 4.5 08/02/2017 08:08 AM    Potassium 4.4 12/19/2016 06:12 PM    Potassium 4.5 04/27/2016 02:06 PM    Potassium 4.6 10/30/2015 07:19 AM    Potassium 4.5 02/18/2015 11:20 AM    Chloride 101 08/02/2017 08:08 AM    Chloride 102 12/19/2016 06:12 PM    Chloride 97 04/27/2016 02:06 PM    Chloride 100 10/30/2015 07:19 AM    Chloride 101 02/18/2015 11:20 AM    CO2 26 08/02/2017 08:08 AM    CO2 25 12/19/2016 06:12 PM    CO2 25 04/27/2016 02:06 PM    CO2 25 10/30/2015 07:19 AM    CO2 23 02/18/2015 11:20 AM    Anion gap 11 10/09/2009 11:00 AM    Glucose 101 08/02/2017 08:08 AM    Glucose 108 12/19/2016 06:12 PM    Glucose 112 04/27/2016 02:06 PM    Glucose 101 10/30/2015 07:19 AM    Glucose 97 02/18/2015 11:20 AM    BUN 14 08/02/2017 08:08 AM    BUN 16 12/19/2016 06:12 PM    BUN 12 04/27/2016 02:06 PM    BUN 13 10/30/2015 07:19 AM    BUN 11 02/18/2015 11:20 AM    Creatinine 0.85 08/02/2017 08:08 AM    Creatinine 0.77 12/19/2016 06:12 PM    Creatinine 0.74 04/27/2016 02:06 PM    Creatinine 0.74 10/30/2015 07:19 AM    Creatinine 0.70 02/18/2015 11:20 AM    BUN/Creatinine ratio 16 08/02/2017 08:08 AM    BUN/Creatinine ratio 21 12/19/2016 06:12 PM    BUN/Creatinine ratio 16 04/27/2016 02:06 PM    BUN/Creatinine ratio 18 10/30/2015 07:19 AM    BUN/Creatinine ratio 16 02/18/2015 11:20 AM    GFR est AA 84 08/02/2017 08:08 AM    GFR est AA 95 12/19/2016 06:12 PM    GFR est  04/27/2016 02:06 PM    GFR est  10/30/2015 07:19 AM    GFR est  02/18/2015 11:20 AM    GFR est non-AA 73 08/02/2017 08:08 AM    GFR est non-AA 82 12/19/2016 06:12 PM    GFR est non-AA 87 04/27/2016 02:06 PM    GFR est non-AA 87 10/30/2015 07:19 AM    GFR est non-AA 94 02/18/2015 11:20 AM    Calcium 10.0 08/02/2017 08:08 AM    Calcium 10.3 12/19/2016 06:12 PM    Calcium 9.9 04/27/2016 02:06 PM    Calcium 10.0 10/30/2015 07:19 AM    Calcium 10.1 02/18/2015 11:20 AM    Bilirubin, total 0.4 08/02/2017 08:08 AM    Bilirubin, total 0.3 12/19/2016 06:12 PM    Bilirubin, total 0.3 04/27/2016 02:06 PM    Bilirubin, total 0.5 10/30/2015 07:19 AM    Bilirubin, total 0.4 02/18/2015 11:20 AM    AST (SGOT) 27 08/02/2017 08:08 AM    AST (SGOT) 45 12/19/2016 06:12 PM    AST (SGOT) 25 04/27/2016 02:06 PM    AST (SGOT) 32 10/30/2015 07:19 AM    AST (SGOT) 27 02/18/2015 11:20 AM    Alk. phosphatase 89 08/02/2017 08:08 AM    Alk. phosphatase 106 12/19/2016 06:12 PM    Alk. phosphatase 83 04/27/2016 02:06 PM    Alk. phosphatase 85 10/30/2015 07:19 AM    Alk.  phosphatase 86 02/18/2015 11:20 AM    Protein, total 7.0 08/02/2017 08:08 AM    Protein, total 6.8 12/19/2016 06:12 PM    Protein, total 7.3 04/27/2016 02:06 PM    Protein, total 7.0 10/30/2015 07:19 AM    Protein, total 7.1 02/18/2015 11:20 AM    Albumin 4.6 08/02/2017 08:08 AM    Albumin 4.4 12/19/2016 06:12 PM    Albumin 4.6 04/27/2016 02:06 PM    Albumin 4.6 10/30/2015 07:19 AM    Albumin 4.6 02/18/2015 11:20 AM    Globulin 3.7 10/09/2009 11:00 AM    A-G Ratio 1.9 08/02/2017 08:08 AM    A-G Ratio 1.8 12/19/2016 06:12 PM    A-G Ratio 1.7 04/27/2016 02:06 PM    A-G Ratio 1.9 10/30/2015 07:19 AM    A-G Ratio 1.8 02/18/2015 11:20 AM    ALT (SGPT) 20 08/02/2017 08:08 AM    ALT (SGPT) 44 12/19/2016 06:12 PM    ALT (SGPT) 31 04/27/2016 02:06 PM    ALT (SGPT) 33 10/30/2015 07:19 AM    ALT (SGPT) 32 02/18/2015 11:20 AM     No results found for: CPK, CPKX, CPX  Lab Results   Component Value Date/Time    Cholesterol, total 169 08/02/2017 08:08 AM    Cholesterol, total 176 12/19/2016 06:12 PM    Cholesterol, total 162 10/30/2015 07:19 AM    Cholesterol, total 167 02/18/2015 11:20 AM    Cholesterol, total 168 08/12/2014 08:46 AM    HDL Cholesterol 61 08/02/2017 08:08 AM    HDL Cholesterol 55 12/19/2016 06:12 PM    HDL Cholesterol 48 10/30/2015 07:19 AM    HDL Cholesterol 54 02/18/2015 11:20 AM    HDL Cholesterol 45 08/12/2014 08:46 AM    LDL, calculated 79 08/02/2017 08:08 AM    LDL, calculated 92 12/19/2016 06:12 PM    LDL, calculated 72 10/30/2015 07:19 AM    LDL, calculated 74 02/18/2015 11:20 AM    LDL, calculated 70 08/12/2014 08:46 AM    Triglyceride 147 08/02/2017 08:08 AM    Triglyceride 147 12/19/2016 06:12 PM    Triglyceride 210 10/30/2015 07:19 AM    Triglyceride 195 02/18/2015 11:20 AM    Triglyceride 267 08/12/2014 08:46 AM     No results found for this or any previous visit. Assessment:         Patient Active Problem List    Diagnosis Date Noted    RBBB (right bundle branch block)     Paroxysmal atrial fibrillation ([facility]     HCVD (hypertensive cardiovascular disease)     Mixed hyperlipidemia     Diverticulosis of colon 11/02/2013    Anxiety 03/18/2013    Esophageal reflux 11/27/2012     Hx chest pain, palpitations, hypertension, PSVT, RBBB, anxiety, PAF (CHADs 0, ASA only). Recent sx of increased palpitations, intermittent CP. Works at [employer]. Seen by PCP, had Lexiscan MPI 8/4/17--no ischemia/infarct, LVEF 70%. Labs 8/2/17 with CBC, CMP ok.      Plan:     Holter monitor--48 hr  Echo/doppler  Continue current rx for now  ETOH, nicotine, caffeine discussed  Will have PCP chest TSH  F/u after tests to discuss    Vikas Jimenez MD

## 2017-09-08 NOTE — MR AVS SNAPSHOT
Visit Information Date & Time Provider Department Dept. Phone Encounter #  
 9/8/2017  2:40 PM Greg Cole, [address] [facility] [address] [place] [id number] [phone] Your Appointments 9/20/2017  4:15 PM  
HOLTER MONITOR with [address], [facility] [place] [facility] Appt Note: holter dx irregular heart rate, palps $cp  
 [address] [facility] [phone]  
  
   
 [address] [address]  
  
    
 9/26/2017  3:20 PM  
ESTABLISHED PATIENT with Greg Cole MD  
[facility] Appt Note: fu post echo and holter $30cp [address] [facility] [phone]  
  
   
 [address] [address] Upcoming Health Maintenance Date Due INFLUENZA AGE 9 TO ADULT 8/1/2017 BREAST CANCER SCRN MAMMOGRAM 12/31/2017 FOBT Q 1 YEAR AGE 50-75 8/3/2018 PAP AKA CERVICAL CYTOLOGY 8/2/2020 DTaP/Tdap/Td series (2 - Td) 8/2/2027 Allergies as of 9/8/2017  Review Complete On: 9/8/2017 By: Greg Cole MD  
  
 Severity Noted Reaction Type Reactions Ciprofloxacin High 11/27/2012   Systemic Anaphylaxis Betadine [Povidone-iodine]  07/21/2015    Other (comments) Burns her skin Morphine  11/27/2012   Systemic Other (comments) Delusions Current Immunizations  Reviewed on 10/10/2015 Name Date Influenza Vaccine 11/5/2014 Influenza Vaccine (Quad) PF 10/10/2015 Not reviewed this visit You Were Diagnosed With   
  
 Codes Comments PAC (premature atrial contraction)    -  Primary ICD-10-CM: I49.1 ICD-9-CM: 427.61 Lightheadedness     ICD-10-CM: G75 ICD-9-CM: 780.4 Palpitations     ICD-10-CM: R00.2 ICD-9-CM: 785.1 RBBB (right bundle branch block)     ICD-10-CM: I45.10 ICD-9-CM: 426.4  Essential hypertension     ICD-10-CM: I10 
 ICD-9-CM: 401.9 Vitals BP Pulse Resp Height(growth percentile) Weight(growth percentile) SpO2  
 132/84 (BP 1 Location: Left arm, BP Patient Position: Sitting) 62 18 5' 10\" (1.778 m) 151 lb (68.5 kg) 100% BMI OB Status Smoking Status 21.67 kg/m2 Postmenopausal Current Some Day Smoker Vitals History BMI and BSA Data Body Mass Index Body Surface Area  
 21.67 kg/m 2 1.84 m 2 Preferred Pharmacy Pharmacy Name Phone [address] [facility],[address], [address] [place] [address]  [phone] Your Updated Medication List  
  
   
This list is accurate as of: 9/8/17  3:29 PM.  Always use your most recent med list.  
  
  
  
  
 aspirin 81 mg tablet Take 81 mg by mouth. atenolol 50 mg tablet Commonly known as:  TENORMIN Take 1 Tab by mouth two (2) times a day. atorvastatin 10 mg tablet Commonly known as:  LIPITOR  
TAKE 1 TABLET BY MOUTH DAILY CALTRATE 600+D PLUS MINERALS 600 mg calcium- 400 unit Tab Generic drug:  Calcium Carbonate-Vit D3-Min Take  by mouth every other day. fluticasone 50 mcg/actuation nasal spray Commonly known as:  Francella Lacks 2 puffs each nostril daily  
  
 ibuprofen 200 mg tablet Commonly known as:  MOTRIN Take  by mouth.  
  
 latanoprost 0.005 % ophthalmic solution Commonly known as:  XALATAN  
  
 multivitamin tablet Commonly known as:  ONE A DAY Take 1 Tab by mouth every other day. omega-3 fatty acids-vitamin e 1,000 mg Cap Commonly known as:  FISH OIL Take 1 Cap by mouth daily. omeprazole 40 mg capsule Commonly known as:  PRILOSEC Take 1 Cap by mouth daily. PARoxetine 10 mg tablet Commonly known as:  PAXIL TAKE ONE TABLET BY MOUTH DAILY  
  
 valACYclovir 1 gram tablet Commonly known as:  VALTREX Take 2 tablets bid for 1 d We Performed the Following AMB POC EKG ROUTINE W/ 12 LEADS, INTER & REP [63725 CPT(R)] Introducing [facility]! Dear Khalif Carlisle: Thank you for requesting a [email or web address] account. Our records indicate that you have previously registered for a [email or web address] account but its currently inactive. Please call our [email or web address] support line at [phone]. Additional Information If you have questions, please visit the Frequently Asked Questions section of the [email or web address] website at [email or web address] Remember, [email or web address] is NOT to be used for urgent needs. For medical emergencies, dial 911. Now available from your iPhone and Android! Please provide this summary of care documentation to your next provider. Your primary care clinician is listed as Yumiko Canas. If you have any questions after today's visit, please call [phone].

## 2017-09-11 RX ORDER — PAROXETINE 10 MG/1
TABLET, FILM COATED ORAL
Qty: 30 TAB | Refills: 5 | Status: SHIPPED | OUTPATIENT
Start: 2017-09-11 | End: 2018-03-16 | Stop reason: SDUPTHER

## 2017-09-12 ENCOUNTER — CLINICAL SUPPORT (OUTPATIENT)
Dept: FAMILY MEDICINE CLINIC | Age: 62
End: 2017-09-12

## 2017-09-12 DIAGNOSIS — I48.0 PAROXYSMAL ATRIAL FIBRILLATION (HCC): Primary | ICD-10-CM

## 2017-09-12 NOTE — PROGRESS NOTES
Patient in office for TSH draw only, okayed by Dr Rosi Manuel. Venipuncture (L) AC, patient tolerated procedure well.   Mahin Sanders RN

## 2017-09-13 LAB — TSH SERPL DL<=0.005 MIU/L-ACNC: 0.64 UIU/ML (ref 0.45–4.5)

## 2017-09-14 ENCOUNTER — TELEPHONE (OUTPATIENT)
Dept: FAMILY MEDICINE CLINIC | Age: 62
End: 2017-09-14

## 2017-09-14 NOTE — TELEPHONE ENCOUNTER
Patient with a history of actinic sun damage    She did present with a 4 mm slightly scaly lesion right temple area    She requested liquid nitrogen treatment  We talked about options including biopsy has it is possible this was an early basal cell  She is aware but would like it frozen first as she has had small basal cells treated successfully in the past with cryotherapy    Area was frozen with liquid nitrogen  2 tiny actinic keratoses right clavicular area frozen as well  I did ask patient to come back in 2 weeks and have the area rechecked

## 2017-09-20 ENCOUNTER — CLINICAL SUPPORT (OUTPATIENT)
Dept: CARDIOLOGY CLINIC | Age: 62
End: 2017-09-20

## 2017-09-20 DIAGNOSIS — I49.1 PAC (PREMATURE ATRIAL CONTRACTION): ICD-10-CM

## 2017-09-20 DIAGNOSIS — R00.2 PALPITATIONS: ICD-10-CM

## 2017-09-20 NOTE — PROGRESS NOTES
24 hour Holter monitor only. Verified patient with two patient identifiers. Pt verbalized understanding of its use. Ordering MD- [facility]   Reason: palpitations, PAC\"s, r/o SVT/afib  Start time: 4:36pm  Return date: 9/22/17         No LOS.

## 2017-09-25 ENCOUNTER — DOCUMENTATION ONLY (OUTPATIENT)
Dept: CARDIOLOGY CLINIC | Age: 62
End: 2017-09-25

## 2017-09-27 ENCOUNTER — OFFICE VISIT (OUTPATIENT)
Dept: CARDIOLOGY CLINIC | Age: 62
End: 2017-09-27

## 2017-09-27 VITALS
DIASTOLIC BLOOD PRESSURE: 102 MMHG | RESPIRATION RATE: 18 BRPM | HEART RATE: 68 BPM | WEIGHT: 150 LBS | OXYGEN SATURATION: 98 % | BODY MASS INDEX: 21.47 KG/M2 | HEIGHT: 70 IN | SYSTOLIC BLOOD PRESSURE: 158 MMHG

## 2017-09-27 DIAGNOSIS — I48.0 PAROXYSMAL ATRIAL FIBRILLATION (HCC): ICD-10-CM

## 2017-09-27 DIAGNOSIS — I10 ESSENTIAL HYPERTENSION: ICD-10-CM

## 2017-09-27 DIAGNOSIS — E78.2 MIXED HYPERLIPIDEMIA: ICD-10-CM

## 2017-09-27 DIAGNOSIS — R00.2 PALPITATIONS: Primary | ICD-10-CM

## 2017-09-27 DIAGNOSIS — I49.1 PAC (PREMATURE ATRIAL CONTRACTION): ICD-10-CM

## 2017-09-27 NOTE — PROGRESS NOTES
Guerrero Delgadillo is a 57 y.o. female is here for f/u to discuss test results. Lexiscan MPI 8/4/17--no ischemia/infarct, LVEF 70%. Holter with NSR, PAC's, rare PVC's. No PAF or runs seen (but no symptoms while monitored). ECHO is pending--being done later today--will call w/ results. TSH was normal, other labs as previously noted. BP elevated today (sinus, bodyaches, etc), normally ok. Has been on atenolol 50mg bid for yrs. The patient denies chest pain/ shortness of breath, orthopnea, PND, LE edema, syncope, presyncope or fatigue. Patient Active Problem List    Diagnosis Date Noted    RBBB (right bundle branch block)     Paroxysmal atrial fibrillation (HCC)     HCVD (hypertensive cardiovascular disease)     Mixed hyperlipidemia     Diverticulosis of colon 11/02/2013    Anxiety 03/18/2013    Esophageal reflux 11/27/2012      Alonzo Reid MD  Past Medical History:   Diagnosis Date    Anxiety     Cataract     GERD (gastroesophageal reflux disease)     Glaucoma     HCVD (hypertensive cardiovascular disease)     Mixed hyperlipidemia     Osteoporosis 12/15    Paroxysmal atrial fibrillation (HCC)     RBBB (right bundle branch block)     Renal stones       Past Surgical History:   Procedure Laterality Date    HX BLADDER REPAIR      HX COLONOSCOPY      q5-7 y    HX CYST INCISION AND DRAINAGE      tumor/lt    HX GYN      hysterectomy     Allergies   Allergen Reactions    Ciprofloxacin Anaphylaxis    Betadine [Povidone-Iodine] Other (comments)     Burns her skin    Morphine Other (comments)     Delusions      Family History   Problem Relation Age of Onset    Diabetes Mother     Heart Disease Father      chf    Cancer Sister      lung ca      Social History     Social History    Marital status:      Spouse name: N/A    Number of children: N/A    Years of education: N/A     Occupational History    Not on file.      Social History Main Topics    Smoking status: Current Some Day Smoker     Types: Cigarettes    Smokeless tobacco: Never Used    Alcohol use Yes      Comment: Occasional    Drug use: No    Sexual activity: Yes     Partners: Male     Other Topics Concern    Not on file     Social History Narrative      Current Outpatient Prescriptions   Medication Sig    PARoxetine (PAXIL) 10 mg tablet TAKE ONE TABLET BY MOUTH DAILY    atorvastatin (LIPITOR) 10 mg tablet TAKE 1 TABLET BY MOUTH DAILY    fluticasone (FLONASE) 50 mcg/actuation nasal spray 2 puffs each nostril daily    valACYclovir (VALTREX) 1 gram tablet Take 2 tablets bid for 1 d    omeprazole (PRILOSEC) 40 mg capsule Take 1 Cap by mouth daily.  ibuprofen (MOTRIN) 200 mg tablet Take  by mouth.  latanoprost (XALATAN) 0.005 % ophthalmic solution     atenolol (TENORMIN) 50 mg tablet Take 1 Tab by mouth two (2) times a day.  multivitamin (ONE A DAY) tablet Take 1 Tab by mouth every other day.  Calcium Carbonate-Vit D3-Min (CALTRATE 600+D PLUS MINERALS) 600 mg calcium- 400 unit Tab Take  by mouth every other day.  aspirin 81 mg tablet Take 81 mg by mouth.  omega-3 fatty acids-vitamin e (FISH OIL) 1,000 mg cap Take 1 Cap by mouth daily. (Patient taking differently: Take 1,200 Caps by mouth daily. Takes 2 daily)     No current facility-administered medications for this visit. Review of Symptoms:    CONST  No weight change. No fever, chills, sweats    ENT No visual changes, URI sx, sore throat    CV  See HPI   RESP  No cough, or sputum, wheezing. Also see HPI   GI  No abdominal pain or change in bowel habits. No heartburn or dysphagia. No melena or rectal bleeding.   No dysuria, urgency, frequency, hematuria   MSKEL  No joint pain, swelling. No muscle pain. SKIN  No rash or lesions. NEURO  No headache, syncope, or seizure. No weakness, loss of sensation, or paresthesias. PSYCH  No low mood or depression  No anxiety.     HE/LYMPH  No easy bruising, abnormal bleeding, or enlarged glands.         Physical ExamPhysical Exam:    Visit Vitals    BP (!) 158/102 (BP 1 Location: Right arm, BP Patient Position: Sitting)    Pulse 68    Resp 18    Ht 5' 10\" (1.778 m)    Wt 150 lb (68 kg)    SpO2 98%    BMI 21.52 kg/m2     Gen: NAD  HEENT:  PERRL, throat clear  Neck: no adenopathy, no thyromegaly, no JVD   Heart:  Regular,Nl S1S2,  no murmur, gallop or rub.   Lungs:  clear  Abdomen:   Soft, non-tender, bowel sounds are active.   Extremities:  No edema  Pulse: symmetric  Neuro: A&O times 3, No focal neuro deficits    Cardiographics    Labs:   Lab Results   Component Value Date/Time    Sodium 143 08/02/2017 08:08 AM    Sodium 144 12/19/2016 06:12 PM    Sodium 140 04/27/2016 02:06 PM    Sodium 141 10/30/2015 07:19 AM    Sodium 141 02/18/2015 11:20 AM    Potassium 4.5 08/02/2017 08:08 AM    Potassium 4.4 12/19/2016 06:12 PM    Potassium 4.5 04/27/2016 02:06 PM    Potassium 4.6 10/30/2015 07:19 AM    Potassium 4.5 02/18/2015 11:20 AM    Chloride 101 08/02/2017 08:08 AM    Chloride 102 12/19/2016 06:12 PM    Chloride 97 04/27/2016 02:06 PM    Chloride 100 10/30/2015 07:19 AM    Chloride 101 02/18/2015 11:20 AM    CO2 26 08/02/2017 08:08 AM    CO2 25 12/19/2016 06:12 PM    CO2 25 04/27/2016 02:06 PM    CO2 25 10/30/2015 07:19 AM    CO2 23 02/18/2015 11:20 AM    Anion gap 11 10/09/2009 11:00 AM    Glucose 101 08/02/2017 08:08 AM    Glucose 108 12/19/2016 06:12 PM    Glucose 112 04/27/2016 02:06 PM    Glucose 101 10/30/2015 07:19 AM    Glucose 97 02/18/2015 11:20 AM    BUN 14 08/02/2017 08:08 AM    BUN 16 12/19/2016 06:12 PM    BUN 12 04/27/2016 02:06 PM    BUN 13 10/30/2015 07:19 AM    BUN 11 02/18/2015 11:20 AM    Creatinine 0.85 08/02/2017 08:08 AM    Creatinine 0.77 12/19/2016 06:12 PM    Creatinine 0.74 04/27/2016 02:06 PM    Creatinine 0.74 10/30/2015 07:19 AM    Creatinine 0.70 02/18/2015 11:20 AM    BUN/Creatinine ratio 16 08/02/2017 08:08 AM    BUN/Creatinine ratio 21 12/19/2016 06:12 PM    BUN/Creatinine ratio 16 04/27/2016 02:06 PM    BUN/Creatinine ratio 18 10/30/2015 07:19 AM    BUN/Creatinine ratio 16 02/18/2015 11:20 AM    GFR est AA 84 08/02/2017 08:08 AM    GFR est AA 95 12/19/2016 06:12 PM    GFR est  04/27/2016 02:06 PM    GFR est  10/30/2015 07:19 AM    GFR est  02/18/2015 11:20 AM    GFR est non-AA 73 08/02/2017 08:08 AM    GFR est non-AA 82 12/19/2016 06:12 PM    GFR est non-AA 87 04/27/2016 02:06 PM    GFR est non-AA 87 10/30/2015 07:19 AM    GFR est non-AA 94 02/18/2015 11:20 AM    Calcium 10.0 08/02/2017 08:08 AM    Calcium 10.3 12/19/2016 06:12 PM    Calcium 9.9 04/27/2016 02:06 PM    Calcium 10.0 10/30/2015 07:19 AM    Calcium 10.1 02/18/2015 11:20 AM    Bilirubin, total 0.4 08/02/2017 08:08 AM    Bilirubin, total 0.3 12/19/2016 06:12 PM    Bilirubin, total 0.3 04/27/2016 02:06 PM    Bilirubin, total 0.5 10/30/2015 07:19 AM    Bilirubin, total 0.4 02/18/2015 11:20 AM    AST (SGOT) 27 08/02/2017 08:08 AM    AST (SGOT) 45 12/19/2016 06:12 PM    AST (SGOT) 25 04/27/2016 02:06 PM    AST (SGOT) 32 10/30/2015 07:19 AM    AST (SGOT) 27 02/18/2015 11:20 AM    Alk. phosphatase 89 08/02/2017 08:08 AM    Alk. phosphatase 106 12/19/2016 06:12 PM    Alk. phosphatase 83 04/27/2016 02:06 PM    Alk. phosphatase 85 10/30/2015 07:19 AM    Alk.  phosphatase 86 02/18/2015 11:20 AM    Protein, total 7.0 08/02/2017 08:08 AM    Protein, total 6.8 12/19/2016 06:12 PM    Protein, total 7.3 04/27/2016 02:06 PM    Protein, total 7.0 10/30/2015 07:19 AM    Protein, total 7.1 02/18/2015 11:20 AM    Albumin 4.6 08/02/2017 08:08 AM    Albumin 4.4 12/19/2016 06:12 PM    Albumin 4.6 04/27/2016 02:06 PM    Albumin 4.6 10/30/2015 07:19 AM    Albumin 4.6 02/18/2015 11:20 AM    Globulin 3.7 10/09/2009 11:00 AM    A-G Ratio 1.9 08/02/2017 08:08 AM    A-G Ratio 1.8 12/19/2016 06:12 PM    A-G Ratio 1.7 04/27/2016 02:06 PM    A-G Ratio 1.9 10/30/2015 07:19 AM    A-G Ratio 1.8 02/18/2015 11:20 AM    ALT (SGPT) 20 08/02/2017 08:08 AM    ALT (SGPT) 44 12/19/2016 06:12 PM    ALT (SGPT) 31 04/27/2016 02:06 PM    ALT (SGPT) 33 10/30/2015 07:19 AM    ALT (SGPT) 32 02/18/2015 11:20 AM     No results found for: CPK, CPKX, CPX  Lab Results   Component Value Date/Time    Cholesterol, total 169 08/02/2017 08:08 AM    Cholesterol, total 176 12/19/2016 06:12 PM    Cholesterol, total 162 10/30/2015 07:19 AM    Cholesterol, total 167 02/18/2015 11:20 AM    Cholesterol, total 168 08/12/2014 08:46 AM    HDL Cholesterol 61 08/02/2017 08:08 AM    HDL Cholesterol 55 12/19/2016 06:12 PM    HDL Cholesterol 48 10/30/2015 07:19 AM    HDL Cholesterol 54 02/18/2015 11:20 AM    HDL Cholesterol 45 08/12/2014 08:46 AM    LDL, calculated 79 08/02/2017 08:08 AM    LDL, calculated 92 12/19/2016 06:12 PM    LDL, calculated 72 10/30/2015 07:19 AM    LDL, calculated 74 02/18/2015 11:20 AM    LDL, calculated 70 08/12/2014 08:46 AM    Triglyceride 147 08/02/2017 08:08 AM    Triglyceride 147 12/19/2016 06:12 PM    Triglyceride 210 10/30/2015 07:19 AM    Triglyceride 195 02/18/2015 11:20 AM    Triglyceride 267 08/12/2014 08:46 AM     No results found for this or any previous visit. Assessment:         Patient Active Problem List    Diagnosis Date Noted    RBBB (right bundle branch block)     Paroxysmal atrial fibrillation ([facility] [address]     HCVD (hypertensive cardiovascular disease)     Mixed hyperlipidemia     Diverticulosis of colon 11/02/2013    Anxiety 03/18/2013    Esophageal reflux 11/27/2012      Lexiscan MPI 8/4/17--no ischemia/infarct, LVEF 70%. Holter with NSR, PAC's, rare PVC's. No PAF or runs seen (but no symptoms while monitored). ECHO is pending--being done later today--will call w/ results. TSH was normal, other labs as previously noted. BP elevated today (sinus, bodyaches, etc), normally ok. Has been on atenolol 50mg bid for yrs. Plan:      Will call w/ Echo results  Can take additional atenolol 50mg (now on bid) prn for tachy episodes  Consider EP referral (Dr Pita Kumar) vs Event monitor (or implantable loop) for infrequent but prolonged episodes--she prefers to hold off for now  Will monitor BP--if remains elevated would consider additional rx--likely ACEI vs ARB    Torsten Hobbs MD

## 2017-09-28 ENCOUNTER — TELEPHONE (OUTPATIENT)
Dept: CARDIOLOGY CLINIC | Age: 62
End: 2017-09-28

## 2017-09-28 NOTE — TELEPHONE ENCOUNTER
Pt notified (after 2 identifiers) per Dr Shital Alex echo looks completely normal, normal LV pumping function, chambers, valves\". Pt acknowledged understanding and had no further questions.

## 2017-09-29 ENCOUNTER — TELEPHONE (OUTPATIENT)
Dept: CARDIOLOGY CLINIC | Age: 62
End: 2017-09-29

## 2017-09-29 RX ORDER — ATENOLOL 50 MG/1
50 TABLET ORAL 2 TIMES DAILY
Qty: 60 TAB | Refills: 12 | Status: SHIPPED | OUTPATIENT
Start: 2017-09-29 | End: 2018-08-16 | Stop reason: SDUPTHER

## 2017-10-02 ENCOUNTER — PATIENT OUTREACH (OUTPATIENT)
Dept: OTHER | Age: 62
End: 2017-10-02

## 2017-10-19 ENCOUNTER — PATIENT OUTREACH (OUTPATIENT)
Dept: OTHER | Age: 62
End: 2017-10-19

## 2017-11-03 ENCOUNTER — OFFICE VISIT (OUTPATIENT)
Dept: FAMILY MEDICINE CLINIC | Age: 62
End: 2017-11-03

## 2017-11-03 VITALS
SYSTOLIC BLOOD PRESSURE: 154 MMHG | TEMPERATURE: 97.6 F | HEART RATE: 76 BPM | BODY MASS INDEX: 20.44 KG/M2 | WEIGHT: 146 LBS | HEIGHT: 71 IN | RESPIRATION RATE: 12 BRPM | OXYGEN SATURATION: 97 % | DIASTOLIC BLOOD PRESSURE: 90 MMHG

## 2017-11-03 DIAGNOSIS — J06.9 UPPER RESPIRATORY TRACT INFECTION, UNSPECIFIED TYPE: Primary | ICD-10-CM

## 2017-11-03 RX ORDER — AZITHROMYCIN 250 MG/1
TABLET, FILM COATED ORAL
Qty: 6 TAB | Refills: 0 | Status: SHIPPED | OUTPATIENT
Start: 2017-11-03 | End: 2017-11-08

## 2017-11-03 NOTE — PROGRESS NOTES
HISTORY OF PRESENT ILLNESS  Celestino Wolff is a 57 y.o. female. Chief Complaint   Patient presents with    Cough     green mucous, no voice       HPI  5-6 d of nasal congestion with thick green mucus  Sore throat, but strep neg  Coughing up green mucus  Lost voice  No fever but chills  Little SOB, no wheezing  No N/V/D  Has not tried anything    ROS  Past Medical History:   Diagnosis Date    Anxiety     Cataract     GERD (gastroesophageal reflux disease)     Glaucoma     HCVD (hypertensive cardiovascular disease)     Ill-defined condition     C3-6 cervical stenosis    Mixed hyperlipidemia     Osteoporosis 12/15    Paroxysmal atrial fibrillation (HCC)     RBBB (right bundle branch block)     Renal stones      Current Outpatient Prescriptions   Medication Sig Dispense Refill    azithromycin (ZITHROMAX) 250 mg tablet Take 2 tablets today, then take 1 tablet daily 6 Tab 0    atorvastatin (LIPITOR) 10 mg tablet TAKE 1 TABLET BY MOUTH DAILY 90 Tab 1    Biotin 2,500 mcg cap Take  by mouth.  HYDROcodone-acetaminophen (NORCO) 5-325 mg per tablet Take 1 Tab by mouth every four (4) hours as needed for Pain. Max Daily Amount: 6 Tabs. 15 Tab 0    cyclobenzaprine (FLEXERIL) 10 mg tablet Take 1 Tab by mouth three (3) times daily as needed for Muscle Spasm(s). 15 Tab 0    atenolol (TENORMIN) 50 mg tablet Take 1 Tab by mouth two (2) times a day. 60 Tab 12    PARoxetine (PAXIL) 10 mg tablet TAKE ONE TABLET BY MOUTH DAILY 30 Tab 5    fluticasone (FLONASE) 50 mcg/actuation nasal spray 2 puffs each nostril daily 1 Bottle 1    valACYclovir (VALTREX) 1 gram tablet Take 2 tablets bid for 1 d 20 Tab 1    omeprazole (PRILOSEC) 40 mg capsule Take 1 Cap by mouth daily. 30 Cap 5    ibuprofen (MOTRIN) 200 mg tablet Take  by mouth.  latanoprost (XALATAN) 0.005 % ophthalmic solution       multivitamin (ONE A DAY) tablet Take 1 Tab by mouth every other day.       Calcium Carbonate-Vit D3-Min (CALTRATE 600+D PLUS MINERALS) 600 mg calcium- 400 unit Tab Take  by mouth every other day.  aspirin 81 mg tablet Take 81 mg by mouth.  omega-3 fatty acids-vitamin e (FISH OIL) 1,000 mg cap Take 1 Cap by mouth daily. (Patient taking differently: Take 1,200 Caps by mouth daily. Takes 2 daily) 90 Cap 3     Allergies   Allergen Reactions    Ciprofloxacin Anaphylaxis    Betadine [Povidone-Iodine] Other (comments)     Burns her skin    Morphine Other (comments)     Delusions     Visit Vitals    /90 (BP 1 Location: Right arm, BP Patient Position: Sitting)    Pulse 76    Temp 97.6 °F (36.4 °C) (Temporal)    Resp 12    Ht 5' 11\" (1.803 m)    Wt 146 lb (66.2 kg)    SpO2 97%    BMI 20.36 kg/m2     Physical Exam   Constitutional: She is oriented to person, place, and time. She appears well-developed and well-nourished. No distress. HENT:   Head: Normocephalic and atraumatic. Right Ear: External ear normal.   Left Ear: External ear normal.   Nose: Nose normal.   Mouth/Throat: Oropharynx is clear and moist. No oropharyngeal exudate. No voice   Eyes: Conjunctivae and EOM are normal. Pupils are equal, round, and reactive to light. Cardiovascular: Normal rate and regular rhythm. Pulmonary/Chest: Effort normal and breath sounds normal. She has no wheezes. Neurological: She is alert and oriented to person, place, and time. Skin: Skin is warm and dry. Psychiatric: She has a normal mood and affect. Nursing note and vitals reviewed. ASSESSMENT and PLAN    ICD-10-CM ICD-9-CM    1.  Upper respiratory tract infection, unspecified type J06.9 465.9 azithromycin (ZITHROMAX) 250 mg tablet

## 2017-11-08 ENCOUNTER — PATIENT OUTREACH (OUTPATIENT)
Dept: OTHER | Age: 62
End: 2017-11-08

## 2017-11-08 NOTE — LETTER
11/8/2017 4:45 PM 
 
Ms. Ronan Sepulveda Daxa Dear Ms. Stevan, My name is Vinny Chavez , Employee Care Manager for [employer], and I have been trying to reach you. The Employee Care Management program is a free-of-charge, confidential service provided to our employees and their family members covered by the [employer]. Part of my job is to follow up with members who have recently been in the hospital or emergency room, to help them coordinate their care and answer questions they may have about their visit. As healthcare providers, we know that patients do better when they have close follow up with a primary care provider (PCP), especially after a hospital or emergency department visit. If you do not have a PCP, I can help you find one that is convenient to you and covered by your insurance. I can also help you understand any after visit instructions, such as what symptoms to watch out for, or any new or changed medications. Remember that you can access your After Visit Summary by logging into your [employer] account. If you do not have a [employer] account, I can help you request access. Our program is designed to provide you with the opportunity to have a [employer] care manager partner with you for your healthcare needs. Please contact me at the below number if I can provide you with assistance for any of the above services. Sincerely, Vinny Chavez RN, BSN  Employee Care Manager [address] [place] [address] Cell [phone] Fax [email or web address]

## 2018-01-30 ENCOUNTER — DOCUMENTATION ONLY (OUTPATIENT)
Dept: FAMILY MEDICINE CLINIC | Age: 63
End: 2018-01-30

## 2018-01-30 RX ORDER — CEPHALEXIN 500 MG/1
500 CAPSULE ORAL 3 TIMES DAILY
Qty: 30 CAP | Refills: 0 | Status: SHIPPED | OUTPATIENT
Start: 2018-01-30 | End: 2018-02-09

## 2018-01-30 NOTE — PROGRESS NOTES
Pt c/o of right great toe with pain laterally for a while  Very tender and mildly red on exam  Will call in Keflex and if not getting better with remove wedge

## 2018-03-05 ENCOUNTER — OFFICE VISIT (OUTPATIENT)
Dept: FAMILY MEDICINE CLINIC | Age: 63
End: 2018-03-05

## 2018-03-05 NOTE — PROGRESS NOTES
HISTORY OF PRESENT ILLNESS  Bradley Salguero is a 57 y.o. female. HPI    ROS  Error    Physical Exam    ASSESSMENT and PLAN    ICD-10-CM ICD-9-CM    1.  ERRONEOUS ENCOUNTER--DISREGARD  [id number]

## 2018-03-16 RX ORDER — PAROXETINE 10 MG/1
TABLET, FILM COATED ORAL
Qty: 30 TAB | Refills: 0 | Status: SHIPPED | OUTPATIENT
Start: 2018-03-16 | End: 2018-04-12 | Stop reason: SDUPTHER

## 2018-03-29 ENCOUNTER — OFFICE VISIT (OUTPATIENT)
Dept: FAMILY MEDICINE CLINIC | Age: 63
End: 2018-03-29

## 2018-03-29 VITALS
HEART RATE: 72 BPM | WEIGHT: 155 LBS | SYSTOLIC BLOOD PRESSURE: 138 MMHG | HEIGHT: 70 IN | OXYGEN SATURATION: 98 % | BODY MASS INDEX: 22.19 KG/M2 | TEMPERATURE: 97.5 F | RESPIRATION RATE: 16 BRPM | DIASTOLIC BLOOD PRESSURE: 78 MMHG

## 2018-03-29 DIAGNOSIS — L72.3 SEBACEOUS CYST: Primary | ICD-10-CM

## 2018-03-29 NOTE — PROGRESS NOTES
HISTORY OF PRESENT ILLNESS  Blanka Gifford is a 57 y.o. female. Chief Complaint   Patient presents with    Cyst     sebaceous cyst removal from chest       HPI  Cyst between breasts getting bigger and painful  Would like to have it removed    ROS  Past Medical History:   Diagnosis Date    Anxiety     Cataract     GERD (gastroesophageal reflux disease)     Glaucoma     HCVD (hypertensive cardiovascular disease)     Ill-defined condition     C3-6 cervical stenosis    Mixed hyperlipidemia     Osteoporosis 12/15    Paroxysmal atrial fibrillation (HCC)     RBBB (right bundle branch block)     Renal stones      Current Outpatient Prescriptions   Medication Sig Dispense Refill    PARoxetine (PAXIL) 10 mg tablet TAKE 1 TABLET BY MOUTH EVERY DAY 30 Tab 0    atorvastatin (LIPITOR) 10 mg tablet TAKE 1 TABLET BY MOUTH DAILY 90 Tab 1    Biotin 2,500 mcg cap Take  by mouth.  HYDROcodone-acetaminophen (NORCO) 5-325 mg per tablet Take 1 Tab by mouth every four (4) hours as needed for Pain. Max Daily Amount: 6 Tabs. 15 Tab 0    cyclobenzaprine (FLEXERIL) 10 mg tablet Take 1 Tab by mouth three (3) times daily as needed for Muscle Spasm(s). 15 Tab 0    atenolol (TENORMIN) 50 mg tablet Take 1 Tab by mouth two (2) times a day. 60 Tab 12    fluticasone (FLONASE) 50 mcg/actuation nasal spray 2 puffs each nostril daily 1 Bottle 1    valACYclovir (VALTREX) 1 gram tablet Take 2 tablets bid for 1 d 20 Tab 1    omeprazole (PRILOSEC) 40 mg capsule Take 1 Cap by mouth daily. 30 Cap 5    ibuprofen (MOTRIN) 200 mg tablet Take  by mouth.  latanoprost (XALATAN) 0.005 % ophthalmic solution       multivitamin (ONE A DAY) tablet Take 1 Tab by mouth every other day.  Calcium Carbonate-Vit D3-Min (CALTRATE 600+D PLUS MINERALS) 600 mg calcium- 400 unit Tab Take  by mouth every other day.  aspirin 81 mg tablet Take 81 mg by mouth.       omega-3 fatty acids-vitamin e (FISH OIL) 1,000 mg cap Take 1 Cap by mouth daily. (Patient taking differently: Take 1,200 Caps by mouth daily. Takes 2 daily) 90 Cap 3     Allergies   Allergen Reactions    Ciprofloxacin Anaphylaxis    Betadine [Povidone-Iodine] Other (comments)     Burns her skin    Morphine Other (comments)     Delusions     Visit Vitals    /78 (BP 1 Location: Left arm, BP Patient Position: Sitting)    Pulse 72    Temp 97.5 °F (36.4 °C)    Resp 16    Ht 5' 10\" (1.778 m)    Wt 155 lb (70.3 kg)    SpO2 98%    BMI 22.24 kg/m2     Physical Exam   Constitutional: She is oriented to person, place, and time. She appears well-developed and well-nourished. No distress. HENT:   Head: Normocephalic and atraumatic. Eyes: Conjunctivae are normal.   Pulmonary/Chest: Effort normal.   Neurological: She is alert and oriented to person, place, and time. Skin:   1 cm cystic lesion in between breasts, tender, no redness   Psychiatric: She has a normal mood and affect. Nursing note and vitals reviewed.     6 cc of 1% Lidocaine with Epi injected  Area cleaned and dressed in sterile fashion  eliptical excision done  3 Sutures placed with 3-0 Nylon  Pressure dressing applied  Path pending      ASSESSMENT and PLAN    ICD-10-CM ICD-9-CM    1. Sebaceous cyst L72.3 706.2 DESTRUC BENIGN LESION, UP TO 14 LESIONS   keep dry for 24 h  monitor for signs of infection  Tylenol for pain prn  RTC in 10 d for Suture removal

## 2018-07-24 ENCOUNTER — OFFICE VISIT (OUTPATIENT)
Dept: FAMILY MEDICINE CLINIC | Age: 63
End: 2018-07-24

## 2018-07-24 VITALS
TEMPERATURE: 97.7 F | HEART RATE: 86 BPM | DIASTOLIC BLOOD PRESSURE: 69 MMHG | BODY MASS INDEX: 21.05 KG/M2 | SYSTOLIC BLOOD PRESSURE: 124 MMHG | WEIGHT: 147 LBS | HEIGHT: 70 IN | RESPIRATION RATE: 16 BRPM | OXYGEN SATURATION: 99 %

## 2018-07-24 DIAGNOSIS — K21.9 GASTROESOPHAGEAL REFLUX DISEASE WITHOUT ESOPHAGITIS: ICD-10-CM

## 2018-07-24 DIAGNOSIS — M25.551 RIGHT HIP PAIN: ICD-10-CM

## 2018-07-24 DIAGNOSIS — F41.9 ANXIETY: ICD-10-CM

## 2018-07-24 DIAGNOSIS — M25.50 ARTHRALGIA, UNSPECIFIED JOINT: ICD-10-CM

## 2018-07-24 DIAGNOSIS — Z00.00 WELCOME TO MEDICARE PREVENTIVE VISIT: Primary | ICD-10-CM

## 2018-07-24 DIAGNOSIS — I48.0 PAROXYSMAL ATRIAL FIBRILLATION (HCC): ICD-10-CM

## 2018-07-24 DIAGNOSIS — Z23 ENCOUNTER FOR IMMUNIZATION: ICD-10-CM

## 2018-07-24 DIAGNOSIS — Z12.39 BREAST CANCER SCREENING: ICD-10-CM

## 2018-07-24 DIAGNOSIS — M81.0 OSTEOPOROSIS, UNSPECIFIED OSTEOPOROSIS TYPE, UNSPECIFIED PATHOLOGICAL FRACTURE PRESENCE: ICD-10-CM

## 2018-07-24 DIAGNOSIS — M79.10 MYALGIA: ICD-10-CM

## 2018-07-24 LAB
BILIRUB UR QL STRIP: NEGATIVE
GLUCOSE UR-MCNC: NEGATIVE MG/DL
KETONES P FAST UR STRIP-MCNC: NEGATIVE MG/DL
PH UR STRIP: 7 [PH] (ref 4.6–8)
PROT UR QL STRIP: NORMAL
SP GR UR STRIP: 1.02 (ref 1–1.03)
UA UROBILINOGEN AMB POC: NORMAL (ref 0.2–1)
URINALYSIS CLARITY POC: CLEAR
URINALYSIS COLOR POC: YELLOW
URINE BLOOD POC: NORMAL
URINE LEUKOCYTES POC: NEGATIVE
URINE NITRITES POC: NEGATIVE

## 2018-07-24 RX ORDER — RANITIDINE 150 MG/1
150 TABLET, FILM COATED ORAL
Qty: 90 TAB | Refills: 1 | Status: SHIPPED | OUTPATIENT
Start: 2018-07-24 | End: 2018-08-16 | Stop reason: SDUPTHER

## 2018-07-24 RX ORDER — VITAMIN E 268 MG
CAPSULE ORAL DAILY
COMMUNITY
End: 2019-06-24

## 2018-07-24 NOTE — PROGRESS NOTES
Chief Complaint   Patient presents with   Maricel Ruiz To Medicare          Health Maintenance Due   Topic Date Due    BREAST CANCER SCRN MAMMOGRAM  12/31/2017    GLAUCOMA SCREENING Q2Y  05/18/2018    Pneumococcal 65+ Low/Medium Risk (1 of 2 - PCV13) 05/18/2018    FOBT Q 1 YEAR AGE 50-75  08/03/2018     Visit Vitals    /69 (BP 1 Location: Left arm, BP Patient Position: Sitting)    Pulse 86    Temp 97.7 °F (36.5 °C) (Oral)    Resp 16    Ht 5' 10\" (1.778 m)    Wt 147 lb (66.7 kg)    SpO2 99%    BMI 21.09 kg/m2     1. Have you been to the ER, urgent care clinic since your last visit? Hospitalized since your last visit? No    2. Have you seen or consulted any other health care providers outside of the [address] [place] [address] since your last visit? Include any pap smears or colon screening.  No    Han Francis LPN

## 2018-07-24 NOTE — PATIENT INSTRUCTIONS
Schedule of Personalized Health Plan  (Provide Copy to Patient)  The best way to stay healthy is to live a healthy lifestyle. A healthy lifestyle includes regular exercise, eating a well-balanced diet, keeping a healthy weight and not smoking. Regular physical exams and screening tests are another important way to take care of yourself. Preventive exams provided by health care providers can find health problems early when treatment works best and can keep you from getting certain diseases or illnesses. Preventive services include exams, lab tests, screenings, shots, monitoring and information to help you take care of your own health. All people over 65 should have a pneumonia shot. Pneumonia shots are usually only needed once in a lifetime unless your doctor decides differently. All people over 65 should have a yearly flu shot. People over 65 are at medium to high risk for Hepatitis B. Three shots are needed for complete protection. In addition to your physical exam, some screening tests are recommended:    Bone mass measurement (dexa scan) is recommended every two years  Diabetes Mellitus screening is recommended every year. Glaucoma is an eye disease caused by high pressure in the eye. An eye exam is recommended every year. Cardiovascular screening tests that check your cholesterol and other blood fat (lipid) levels are recommended every five years. Colorectal Cancer screening tests help to find pre-cancerous polyps (growths in the colon) so they can be removed before they turn into cancer. Tests ordered for screening depend on your personal and family history risk factors.     Screening for Breast Cancer is recommended yearly with a mammogram.    Screening for Cervical Cancer is recommended every two years (annually for certain risk factors, such as previous history of STD or abnormal PAP in past 7 years), with a Pelvic Exam with PAP    Here is a list of your current Health Maintenance items with a due date:  Health Maintenance   Topic Date Due    BREAST CANCER SCRN MAMMOGRAM  12/31/2017    GLAUCOMA SCREENING Q2Y  05/18/2018    Pneumococcal 65+ Low/Medium Risk (1 of 2 - PCV13) 05/18/2018    FOBT Q 1 YEAR AGE 50-75  08/03/2018    Influenza Age 9 to Adult  08/01/2018    DTaP/Tdap/Td series (2 - Td) 08/02/2027    Hepatitis C Screening  Completed    Bone Densitometry (Dexa) Screening  Completed    ZOSTER VACCINE AGE 60>  Addressed

## 2018-07-24 NOTE — PROGRESS NOTES
HISTORY OF PRESENT ILLNESS  Christi Mcnamara is a 70 y.o. female. Chief Complaint   Patient presents with   Dewanddiana Tonya To Medicare            HPI  Welcome to Medicare     Had EKG with Dr Armin David in   Will make F/U apt  Taking Atenolol and P Afib controlled    Hx of Osteoporosis  Taking Ca and Vit D one a day  Due for recheck  Never had Vit D checked    Doing well on Paxil  No depression or anxiety now    GERD  Ran out of Prilosec  Taking Ranitidine for 3 w one every other day now    Pain in right hip  Radiating down the leg past knee  Joints ache, throbbing  Was checked for RA  Can't recall tick bite    Can't sleep, wakes her up  Stepped off ladder 3 w ago  And worse since  But myalgia and arthralgias for years    Taking Tylenol and Ibuprofen prn    Review of Systems   Constitutional: Negative for fever and weight loss. HENT: Negative for congestion. Eyes: Negative for blurred vision. Respiratory: Negative for cough. Cardiovascular: Negative for chest pain, palpitations and leg swelling. Gastrointestinal: Positive for diarrhea (d/t fish oil). Negative for abdominal pain, blood in stool and melena. Genitourinary: Negative for frequency. Musculoskeletal: Positive for back pain and myalgias. Skin: Negative. Neurological: Negative for dizziness and headaches. Endo/Heme/Allergies: Negative for polydipsia. Psychiatric/Behavioral: Negative for depression. The patient is not nervous/anxious.       Past Medical History:   Diagnosis Date    Anxiety     Cataract     GERD (gastroesophageal reflux disease)     Glaucoma     HCVD (hypertensive cardiovascular disease)     Herpes labialis     Ill-defined condition     C3-6 cervical stenosis    Mixed hyperlipidemia     Osteoporosis 12/15    Paroxysmal atrial fibrillation (HCC)     RBBB (right bundle branch block)     Renal stones        Past Surgical History:   Procedure Laterality Date    HX BLADDER REPAIR      HX  SECTION      HX COLONOSCOPY      q5-7 y    HX CYST INCISION AND DRAINAGE      left breast, benign    HX GYN      hysterectomy       Current Outpatient Prescriptions   Medication Sig Dispense Refill    vitamin E (AQUA GEMS) 400 unit capsule Take  by mouth daily.  raNITIdine (ZANTAC) 150 mg tablet Take 1 Tab by mouth nightly. 90 Tab 1    atorvastatin (LIPITOR) 10 mg tablet TAKE 1 TABLET BY MOUTH DAILY 90 Tab 0    PARoxetine (PAXIL) 10 mg tablet TAKE 1 TABLET BY MOUTH EVERY DAY 90 Tab 0    cyclobenzaprine (FLEXERIL) 10 mg tablet Take 1 Tab by mouth three (3) times daily as needed for Muscle Spasm(s). 15 Tab 0    atenolol (TENORMIN) 50 mg tablet Take 1 Tab by mouth two (2) times a day. 60 Tab 12    fluticasone (FLONASE) 50 mcg/actuation nasal spray 2 puffs each nostril daily 1 Bottle 1    valACYclovir (VALTREX) 1 gram tablet Take 2 tablets bid for 1 d 20 Tab 1    ibuprofen (MOTRIN) 200 mg tablet Take  by mouth.  latanoprost (XALATAN) 0.005 % ophthalmic solution       multivitamin (ONE A DAY) tablet Take 1 Tab by mouth every other day.  Calcium Carbonate-Vit D3-Min (CALTRATE 600+D PLUS MINERALS) 600 mg calcium- 400 unit Tab Take  by mouth every other day.  aspirin 81 mg tablet Take 81 mg by mouth.  omega-3 fatty acids-vitamin e (FISH OIL) 1,000 mg cap Take 1 Cap by mouth daily. (Patient taking differently: Take 1,200 Caps by mouth daily. Takes 2 daily) 90 Cap 3       Allergies   Allergen Reactions    Ciprofloxacin Anaphylaxis    Betadine [Povidone-Iodine] Other (comments)     Burns her skin    Morphine Other (comments)     Delusions       Visit Vitals    /69 (BP 1 Location: Left arm, BP Patient Position: Sitting)    Pulse 86    Temp 97.7 °F (36.5 °C) (Oral)    Resp 16    Ht 5' 10\" (1.778 m)    Wt 147 lb (66.7 kg)    SpO2 99%    BMI 21.09 kg/m2       Physical Exam   Constitutional: She is oriented to person, place, and time. She appears well-developed and well-nourished.  No distress. HENT:   Head: Normocephalic and atraumatic. Right Ear: External ear normal.   Left Ear: External ear normal.   Mouth/Throat: Oropharynx is clear and moist. No oropharyngeal exudate. Eyes: Conjunctivae and EOM are normal.   Cardiovascular: Normal rate and regular rhythm. Pulmonary/Chest: Effort normal and breath sounds normal.   Abdominal: Soft. Bowel sounds are normal.   Musculoskeletal: Normal range of motion. She exhibits no edema or tenderness. Pain in right groin with ROM of right hip   Lymphadenopathy:     She has no cervical adenopathy. Neurological: She is alert and oriented to person, place, and time. Skin: Skin is warm and dry. Psychiatric: She has a normal mood and affect. Nursing note and vitals reviewed. ASSESSMENT and PLAN    ICD-10-CM ICD-9-CM    1. Welcome to Medicare preventive visit Z00.00 V70.0 AMB POC URINALYSIS DIP STICK AUTO W/O MICRO   2. Breast cancer screening Z12.31 V76.10 LIZETH 3D JEANETTE W MAMMO BI SCREENING INCL CAD   3. Encounter for immunization Z23 V03.89 PNEUMOCOCCAL CONJ VACCINE 13 VALENT IM   4. Osteoporosis, unspecified osteoporosis type, unspecified pathological fracture presence M81.0 733.00 DEXA BONE DENSITY STUDY AXIAL      VITAMIN D, 25 HYDROXY   5. Paroxysmal atrial fibrillation (HCC) I48.0 427.31    6. Anxiety F41.9 300.00    7. Right hip pain M25.551 719.45 XR HIP RT W OR WO PELV 2-3 VWS      URIC ACID   8. Arthralgia, unspecified joint M25.50 719.40 R RICKETTSII AB IGG W/REFL      LYME AB/WESTERN BLOT REFLEX      C REACTIVE PROTEIN, QT      DYLLAN QL, W/REFLEX CASCADE      RA + CCP ABS   9.  Myalgia M79.1 729.1 CK      SED RATE (ESR)

## 2018-07-24 NOTE — ACP (ADVANCE CARE PLANNING)
Advance Care Planning (ACP) Provider Conversation Snapshot    Date of ACP Conversation: 07/24/18  Persons included in Conversation:  patient  Length of ACP Conversation in minutes:  <16 minutes (Non-Billable)      HO given and pt will bring back a copy once filled out

## 2018-07-30 ENCOUNTER — OFFICE VISIT (OUTPATIENT)
Dept: FAMILY MEDICINE CLINIC | Age: 63
End: 2018-07-30

## 2018-07-30 VITALS
SYSTOLIC BLOOD PRESSURE: 150 MMHG | WEIGHT: 142 LBS | TEMPERATURE: 97 F | RESPIRATION RATE: 16 BRPM | HEIGHT: 70 IN | DIASTOLIC BLOOD PRESSURE: 88 MMHG | BODY MASS INDEX: 20.33 KG/M2 | HEART RATE: 79 BPM

## 2018-07-30 DIAGNOSIS — R10.9 ABDOMINAL CRAMPING: ICD-10-CM

## 2018-07-30 DIAGNOSIS — R19.7 DIARRHEA OF PRESUMED INFECTIOUS ORIGIN: ICD-10-CM

## 2018-07-30 DIAGNOSIS — R10.819 LOWER ABDOMINAL TENDERNESS: ICD-10-CM

## 2018-07-30 DIAGNOSIS — K57.30 DIVERTICULOSIS OF COLON: ICD-10-CM

## 2018-07-30 DIAGNOSIS — R10.84 GENERALIZED ABDOMINAL PAIN: ICD-10-CM

## 2018-07-30 DIAGNOSIS — R82.90 ABNORMAL URINALYSIS: ICD-10-CM

## 2018-07-30 DIAGNOSIS — K57.92 DIVERTICULITIS: Primary | ICD-10-CM

## 2018-07-30 LAB
BILIRUB UR QL STRIP: NEGATIVE
GLUCOSE UR-MCNC: NEGATIVE MG/DL
KETONES P FAST UR STRIP-MCNC: NEGATIVE MG/DL
PH UR STRIP: 5.5 [PH] (ref 4.6–8)
PROT UR QL STRIP: NORMAL
SP GR UR STRIP: 1.02 (ref 1–1.03)
UA UROBILINOGEN AMB POC: NORMAL (ref 0.2–1)
URINALYSIS CLARITY POC: CLEAR
URINALYSIS COLOR POC: YELLOW
URINE BLOOD POC: NORMAL
URINE LEUKOCYTES POC: NORMAL
URINE NITRITES POC: NEGATIVE

## 2018-07-30 RX ORDER — DICYCLOMINE HYDROCHLORIDE 20 MG/1
20 TABLET ORAL EVERY 6 HOURS
Qty: 30 TAB | Refills: 0 | Status: SHIPPED | OUTPATIENT
Start: 2018-07-30 | End: 2020-03-17

## 2018-07-30 RX ORDER — AMOXICILLIN AND CLAVULANATE POTASSIUM 500; 125 MG/1; MG/1
1 TABLET, FILM COATED ORAL EVERY 8 HOURS
Qty: 21 TAB | Refills: 0 | Status: SHIPPED | OUTPATIENT
Start: 2018-07-30 | End: 2018-08-06

## 2018-07-30 NOTE — PROGRESS NOTES
Jonnie Worley is a 70 y.o. female who presents to the office today with the following:  Chief Complaint   Patient presents with    Diarrhea     diarrhea X2 weeks, weight loss       HPI  Progressively worsening and \"explosive\" diarrhea x 2 weeks. Also a lot of bloating, belching, and flatulence. Very loose, brown, oily. No blood. Urgency, notably after meals. A little nauseated. Exaggerated bowel sounds. Lower half abd tender. A lot of pain, almost feels like hunger pains, \"knawing\" in lower abd, lot of cramping. Has a lot of pressure around rectum this morning. Says stool has a strange odor, not nl. She d/c her fish oil and no improvement. H/o diverticulosis and one benign polyp on previous colonoscopies- is due for her next soon. Has apt with Dr. Shelbi Oliveira.    Legs feels weak. Has XR pending for R hip (that has been bothering). Ate salad an restaurant couple weeks ago, otherwise cannot identify any unusual or undercooked foods. Pt says back also \"killing me\" today. No  sxs. Review of Systems   Constitutional: Positive for malaise/fatigue. Negative for chills and fever. Respiratory: Negative for cough and shortness of breath. Cardiovascular: Negative for chest pain. Gastrointestinal: Positive for abdominal pain, diarrhea and nausea. Negative for blood in stool, constipation, heartburn, melena and vomiting. Genitourinary: Negative. Musculoskeletal: Negative for myalgias. Skin: Negative for rash. Neurological: Negative. See HPI.     Past Medical History:   Diagnosis Date    Anxiety     Cataract     GERD (gastroesophageal reflux disease)     Glaucoma     open angle    HCVD (hypertensive cardiovascular disease)     Herpes labialis     Ill-defined condition     C3-6 cervical stenosis    Mixed hyperlipidemia     Osteoporosis 12/15    Paroxysmal atrial fibrillation (HCC)     RBBB (right bundle branch block)     Renal stones        Past Surgical History:   Procedure Laterality Date    HX BLADDER REPAIR      HX  SECTION      HX COLONOSCOPY      q5-7 y    HX CYST INCISION AND DRAINAGE      left breast, benign    HX GYN      hysterectomy       Allergies   Allergen Reactions    Ciprofloxacin Anaphylaxis    Betadine [Povidone-Iodine] Other (comments)     Burns her skin    Morphine Other (comments)     Delusions       Current Outpatient Prescriptions   Medication Sig    dicyclomine (BENTYL) 20 mg tablet Take 1 Tab by mouth every six (6) hours.  amoxicillin-clavulanate (AUGMENTIN) 500-125 mg per tablet Take 1 Tab by mouth every eight (8) hours for 7 days.  vitamin E (AQUA GEMS) 400 unit capsule Take  by mouth daily.  raNITIdine (ZANTAC) 150 mg tablet Take 1 Tab by mouth nightly. (Patient taking differently: Take 150 mg by mouth nightly. Indications: every other day)    atorvastatin (LIPITOR) 10 mg tablet TAKE 1 TABLET BY MOUTH DAILY    PARoxetine (PAXIL) 10 mg tablet TAKE 1 TABLET BY MOUTH EVERY DAY    atenolol (TENORMIN) 50 mg tablet Take 1 Tab by mouth two (2) times a day.  valACYclovir (VALTREX) 1 gram tablet Take 2 tablets bid for 1 d    ibuprofen (MOTRIN) 200 mg tablet Take  by mouth every six (6) hours as needed.  latanoprost (XALATAN) 0.005 % ophthalmic solution     multivitamin (ONE A DAY) tablet Take 1 Tab by mouth every other day.  Calcium Carbonate-Vit D3-Min (CALTRATE 600+D PLUS MINERALS) 600 mg calcium- 400 unit Tab Take  by mouth two (2) times a day.  aspirin 81 mg tablet Take 81 mg by mouth.  omega-3 fatty acids-vitamin e (FISH OIL) 1,000 mg cap Take 1 Cap by mouth daily. (Patient taking differently: Take 1,200 Caps by mouth daily. Takes 2 daily)    cyclobenzaprine (FLEXERIL) 10 mg tablet Take 1 Tab by mouth three (3) times daily as needed for Muscle Spasm(s).  fluticasone (FLONASE) 50 mcg/actuation nasal spray 2 puffs each nostril daily     No current facility-administered medications for this visit.         Social History     Social History    Marital status:      Spouse name: N/A    Number of children: N/A    Years of education: N/A     Social History Main Topics    Smoking status: Current Some Day Smoker     Packs/day: 0.50     Years: 35.00     Types: Cigarettes    Smokeless tobacco: Never Used    Alcohol use Yes      Comment: Occasional    Drug use: No    Sexual activity: Yes     Partners: Male     Other Topics Concern    None     Social History Narrative       Family History   Problem Relation Age of Onset    Diabetes Mother     Dementia Mother     Heart Disease Father      chf    Cancer Sister      lung ca         Physical Exam:  Visit Vitals    /88 (BP 1 Location: Left arm, BP Patient Position: Sitting)    Pulse 79    Temp 97 °F (36.1 °C) (Oral)    Resp 16    Ht 5' 10\" (1.778 m)    Wt 142 lb (64.4 kg)    BMI 20.37 kg/m2     Physical Exam   Constitutional: She is oriented to person, place, and time and well-developed, well-nourished, and in no distress. Appears uncomfortable, rocks back and forth slightly to keep mind of discomfort, but not writhing in pain   HENT:   Head: Normocephalic and atraumatic. Mouth/Throat: Oropharynx is clear and moist.   Eyes: Conjunctivae are normal.   Neck: Normal range of motion. Neck supple. Cardiovascular: Normal rate, regular rhythm and normal heart sounds. Pulmonary/Chest: Effort normal and breath sounds normal.   Abdominal: Soft. Normal appearance and bowel sounds are normal. She exhibits no distension and no mass. There is no hepatosplenomegaly. There is tenderness (diffuse discomfort with tenderness noted lower half abd, slightly worse in LLQ). There is no rigidity, no rebound, no guarding, no CVA tenderness, no tenderness at McBurney's point and negative Croft's sign. Musculoskeletal: Normal range of motion. Lymphadenopathy:     She has no cervical adenopathy. Neurological: She is alert and oriented to person, place, and time.  Gait normal.   Skin: Skin is warm and dry. Psychiatric: Mood and affect normal.   Nursing note and vitals reviewed. Assessment/Plan:    ICD-10-CM ICD-9-CM    1. Diverticulitis K57.92 562.11 amoxicillin-clavulanate (AUGMENTIN) 500-125 mg per tablet   2. Diarrhea of presumed infectious origin R19.7 009.3 OVA & PARASITES, STOOL      CULTURE, STOOL      CELIAC ANTIBODY PROFILE      CBC WITH AUTOMATED DIFF      METABOLIC PANEL, COMPREHENSIVE      AMB POC URINALYSIS DIP STICK AUTO W/O MICRO      AMYLASE      LIPASE      WBC, STOOL      C DIFFICILE TOXIN A & B BY EIA      CA HANDLG&/OR CONVEY OF SPEC FOR TR OFFICE TO LAB      COLLECTION VENOUS BLOOD,VENIPUNCTURE   3. Diverticulosis of colon K57.30 562.10    4. Generalized abdominal pain R10.84 789.07 OVA & PARASITES, STOOL      CULTURE, STOOL      CELIAC ANTIBODY PROFILE      CBC WITH AUTOMATED DIFF      METABOLIC PANEL, COMPREHENSIVE      AMB POC URINALYSIS DIP STICK AUTO W/O MICRO      AMYLASE      LIPASE      WBC, STOOL   5. Lower abdominal tenderness R10.819 789.69    6. Abdominal cramping R10.9 789.00 dicyclomine (BENTYL) 20 mg tablet   7. Abnormal urinalysis R82.90 791.9 CULTURE, URINE     Results for orders placed or performed in visit on 07/30/18   AMB POC URINALYSIS DIP STICK AUTO W/O MICRO   Result Value Ref Range    Color (UA POC) Yellow     Clarity (UA POC) Clear     Glucose (UA POC) Negative Negative    Bilirubin (UA POC) Negative Negative    Ketones (UA POC) Negative Negative    Specific gravity (UA POC) 1.020 1.001 - 1.035    Blood (UA POC) 1+ Negative    pH (UA POC) 5.5 4.6 - 8.0    Protein (UA POC) Trace Negative    Urobilinogen (UA POC) 0.2 mg/dL 0.2 - 1    Nitrites (UA POC) Negative Negative    Leukocyte esterase (UA POC) 1+ Negative     Counseled on home care tx and if any worsening to ER. rec BRAT diet and proper hydration. Avoid dairy. Take probiotic. Counseled pt on potential medication AEs/interactions. otc meds for sxs.   Notify if any change. F/u as planned with Dr. Felix steele for colonoscopy. Follow-up Disposition:  Return in about 1 week (around 8/6/2018), or sooner (or to ER if severe) if symptoms worsen or fail to improve.     Garrison Poe PA-C

## 2018-07-30 NOTE — PROGRESS NOTES
Chief Complaint   Patient presents with    Diarrhea     diarrhea X2 weeks, weight loss     Health Maintenance Due   Topic Date Due    BREAST CANCER SCRN MAMMOGRAM  12/31/2017    COLONOSCOPY  09/12/2018     Visit Vitals    /88 (BP 1 Location: Left arm, BP Patient Position: Sitting)    Pulse 79    Temp 97 °F (36.1 °C) (Oral)    Resp 16    Ht 5' 10\" (1.778 m)    Wt 142 lb (64.4 kg)    BMI 20.37 kg/m2     1. Have you been to the ER, urgent care clinic since your last visit? Hospitalized since your last visit? No    2. Have you seen or consulted any other health care providers outside of the [address] [place] [address] since your last visit? Include any pap smears or colon screening.  No    Renetta ALE Stringer

## 2018-07-30 NOTE — PATIENT INSTRUCTIONS
Diverticulitis: Care Instructions  Your Care Instructions    Diverticulitis occurs when pouches form in the wall of the colon and become inflamed or infected. It can be very painful. Doctors aren't sure what causes diverticulitis. There is no proof that foods such as nuts, seeds, or berries cause it or make it worse. A low-fiber diet may cause the colon to work harder to push stool forward. Pouches may form because of this extra work. It may be hard to think about healthy eating while you're in pain. But as you recover, you might think about how you can use healthy eating for overall better health. Healthy eating may help you avoid future attacks. Follow-up care is a key part of your treatment and safety. Be sure to make and go to all appointments, and call your doctor if you are having problems. It's also a good idea to know your test results and keep a list of the medicines you take. How can you care for yourself at home? · Drink plenty of fluids, enough so that your urine is light yellow or clear like water. If you have kidney, heart, or liver disease and have to limit fluids, talk with your doctor before you increase the amount of fluids you drink. · Stick to liquids or a bland diet (plain rice, bananas, dry toast or crackers, applesauce) until you are feeling better. Then you can return to regular foods and gradually increase the amount of fiber in your diet. · Use a heating pad set on low on your belly to relieve mild cramps and pain. · Get extra rest until you are feeling better. · Be safe with medicines. Read and follow all instructions on the label. ¨ If the doctor gave you a prescription medicine for pain, take it as prescribed. ¨ If you are not taking a prescription pain medicine, ask your doctor if you can take an over-the-counter medicine. · If your doctor prescribed antibiotics, take them as directed. Do not stop taking them just because you feel better.  You need to take the full course of antibiotics. To prevent future attacks of diverticulitis  · Avoid constipation:  ¨ Include fruits, vegetables, beans, and whole grains in your diet each day. These foods are high in fiber. ¨ Drink plenty of fluids, enough so that your urine is light yellow or clear like water. If you have kidney, heart, or liver disease and have to limit fluids, talk with your doctor before you increase the amount of fluids you drink. ¨ Get some exercise every day. Build up slowly to 30 to 60 minutes a day on 5 or more days of the week. ¨ Take a fiber supplement, such as Citrucel or Metamucil, every day if needed. Read and follow all instructions on the label. ¨ Schedule time each day for a bowel movement. Having a daily routine may help. Take your time and do not strain when having a bowel movement. When should you call for help? Call your doctor now or seek immediate medical care if:    · You have a fever.     · You are vomiting.     · You have new or worse belly pain.     · You cannot pass stools or gas.    Watch closely for changes in your health, and be sure to contact your doctor if you have any problems. Where can you learn more? Go to http://alex-vamsi.info/. Enter H901 in the search box to learn more about \"Diverticulitis: Care Instructions. \"  Current as of: May 12, 2017  Content Version: 11.7  © 8839-2069 [employer]. Care instructions adapted under license by [employer] (which disclaims liability or warranty for this information). If you have questions about a medical condition or this instruction, always ask your healthcare professional. [place] [address] any warranty or liability for your use of this information. Abdominal Pain: Care Instructions  Your Care Instructions    Abdominal pain has many possible causes. Some aren't serious and get better on their own in a few days. Others need more testing and treatment.  If your pain continues or gets worse, you need to be rechecked and may need more tests to find out what is wrong. You may need surgery to correct the problem. Don't ignore new symptoms, such as fever, nausea and vomiting, urination problems, pain that gets worse, and dizziness. These may be signs of a more serious problem. Your doctor may have recommended a follow-up visit in the next 8 to 12 hours. If you are not getting better, you may need more tests or treatment. The doctor has checked you carefully, but problems can develop later. If you notice any problems or new symptoms, get medical treatment right away. Follow-up care is a key part of your treatment and safety. Be sure to make and go to all appointments, and call your doctor if you are having problems. It's also a good idea to know your test results and keep a list of the medicines you take. How can you care for yourself at home? · Rest until you feel better. · To prevent dehydration, drink plenty of fluids, enough so that your urine is light yellow or clear like water. Choose water and other caffeine-free clear liquids until you feel better. If you have kidney, heart, or liver disease and have to limit fluids, talk with your doctor before you increase the amount of fluids you drink. · If your stomach is upset, eat mild foods, such as rice, dry toast or crackers, bananas, and applesauce. Try eating several small meals instead of two or three large ones. · Wait until 48 hours after all symptoms have gone away before you have spicy foods, alcohol, and drinks that contain caffeine. · Do not eat foods that are high in fat. · Avoid anti-inflammatory medicines such as aspirin, ibuprofen (Advil, Motrin), and naproxen (Aleve). These can cause stomach upset. Talk to your doctor if you take daily aspirin for another health problem. When should you call for help? Call 911 anytime you think you may need emergency care.  For example, call if:    · You passed out (lost consciousness).     · You pass maroon or very bloody stools.     · You vomit blood or what looks like coffee grounds.     · You have new, severe belly pain.    Call your doctor now or seek immediate medical care if:    · Your pain gets worse, especially if it becomes focused in one area of your belly.     · You have a new or higher fever.     · Your stools are black and look like tar, or they have streaks of blood.     · You have unexpected vaginal bleeding.     · You have symptoms of a urinary tract infection. These may include:  ¨ Pain when you urinate. ¨ Urinating more often than usual.  ¨ Blood in your urine.     · You are dizzy or lightheaded, or you feel like you may faint.    Watch closely for changes in your health, and be sure to contact your doctor if:    · You are not getting better after 1 day (24 hours). Where can you learn more? Go to http://alex-vamsi.info/. Enter R610 in the search box to learn more about \"Abdominal Pain: Care Instructions. \"  Current as of: November 20, 2017  Content Version: 11.7  © 8876-7706 [employer]. Care instructions adapted under license by [employer] (which disclaims liability or warranty for this information). If you have questions about a medical condition or this instruction, always ask your healthcare professional. [address] any warranty or liability for your use of this information.

## 2018-07-31 LAB
25(OH)D3+25(OH)D2 SERPL-MCNC: 39.9 NG/ML (ref 30–100)
ALBUMIN SERPL-MCNC: 4.6 G/DL (ref 3.6–4.8)
ALBUMIN/GLOB SERPL: 1.6 {RATIO} (ref 1.2–2.2)
ALP SERPL-CCNC: 125 IU/L (ref 39–117)
ALT SERPL-CCNC: 22 IU/L (ref 0–32)
AMYLASE SERPL-CCNC: 40 U/L (ref 31–124)
ANA SER QL: NEGATIVE
AST SERPL-CCNC: 21 IU/L (ref 0–40)
B BURGDOR IGG PATRN SER IB-IMP: NEGATIVE
B BURGDOR IGG+IGM SER-ACNC: 1.17 ISR (ref 0–0.9)
B BURGDOR IGM PATRN SER IB-IMP: NEGATIVE
B BURGDOR IGM SER IA-ACNC: <0.8 INDEX (ref 0–0.79)
B BURGDOR18KD IGG SER QL IB: ABNORMAL
B BURGDOR23KD IGG SER QL IB: ABNORMAL
B BURGDOR23KD IGM SER QL IB: ABNORMAL
B BURGDOR28KD IGG SER QL IB: ABNORMAL
B BURGDOR30KD IGG SER QL IB: ABNORMAL
B BURGDOR39KD IGG SER QL IB: ABNORMAL
B BURGDOR39KD IGM SER QL IB: ABNORMAL
B BURGDOR41KD IGG SER QL IB: ABNORMAL
B BURGDOR41KD IGM SER QL IB: ABNORMAL
B BURGDOR45KD IGG SER QL IB: ABNORMAL
B BURGDOR58KD IGG SER QL IB: ABNORMAL
B BURGDOR66KD IGG SER QL IB: ABNORMAL
B BURGDOR93KD IGG SER QL IB: PRESENT
BASOPHILS # BLD AUTO: 0.1 X10E3/UL (ref 0–0.2)
BASOPHILS NFR BLD AUTO: 1 %
BILIRUB SERPL-MCNC: 0.4 MG/DL (ref 0–1.2)
BUN SERPL-MCNC: 12 MG/DL (ref 8–27)
BUN/CREAT SERPL: 17 (ref 12–28)
CALCIUM SERPL-MCNC: 10.4 MG/DL (ref 8.7–10.3)
CCP IGA+IGG SERPL IA-ACNC: 8 UNITS (ref 0–19)
CHLORIDE SERPL-SCNC: 103 MMOL/L (ref 96–106)
CK SERPL-CCNC: 69 U/L (ref 24–173)
CO2 SERPL-SCNC: 23 MMOL/L (ref 20–29)
CREAT SERPL-MCNC: 0.71 MG/DL (ref 0.57–1)
CRP SERPL-MCNC: 14.8 MG/L (ref 0–4.9)
EOSINOPHIL # BLD AUTO: 0.2 X10E3/UL (ref 0–0.4)
EOSINOPHIL NFR BLD AUTO: 2 %
ERYTHROCYTE [DISTWIDTH] IN BLOOD BY AUTOMATED COUNT: 12.8 % (ref 12.3–15.4)
ERYTHROCYTE [SEDIMENTATION RATE] IN BLOOD BY WESTERGREN METHOD: 8 MM/HR (ref 0–40)
GLIADIN PEPTIDE IGA SER-ACNC: 3 UNITS (ref 0–19)
GLIADIN PEPTIDE IGG SER-ACNC: 2 UNITS (ref 0–19)
GLOBULIN SER CALC-MCNC: 2.9 G/DL (ref 1.5–4.5)
GLUCOSE SERPL-MCNC: 80 MG/DL (ref 65–99)
HCT VFR BLD AUTO: 39 % (ref 34–46.6)
HGB BLD-MCNC: 13.7 G/DL (ref 11.1–15.9)
IGA SERPL-MCNC: 225 MG/DL (ref 87–352)
IMM GRANULOCYTES # BLD: 0 X10E3/UL (ref 0–0.1)
IMM GRANULOCYTES NFR BLD: 0 %
LIPASE SERPL-CCNC: 32 U/L (ref 14–72)
LYMPHOCYTES # BLD AUTO: 1.4 X10E3/UL (ref 0.7–3.1)
LYMPHOCYTES NFR BLD AUTO: 10 %
MCH RBC QN AUTO: 32.1 PG (ref 26.6–33)
MCHC RBC AUTO-ENTMCNC: 35.1 G/DL (ref 31.5–35.7)
MCV RBC AUTO: 91 FL (ref 79–97)
MONOCYTES # BLD AUTO: 1 X10E3/UL (ref 0.1–0.9)
MONOCYTES NFR BLD AUTO: 7 %
NEUTROPHILS # BLD AUTO: 10.9 X10E3/UL (ref 1.4–7)
NEUTROPHILS NFR BLD AUTO: 80 %
PLATELET # BLD AUTO: 429 X10E3/UL (ref 150–379)
POTASSIUM SERPL-SCNC: 4.2 MMOL/L (ref 3.5–5.2)
PROT SERPL-MCNC: 7.5 G/DL (ref 6–8.5)
R RICKETTSI IGG SER QL IA: NEGATIVE
RBC # BLD AUTO: 4.27 X10E6/UL (ref 3.77–5.28)
RHEUMATOID FACT SERPL-ACNC: <10 IU/ML (ref 0–13.9)
SEE BELOW:, 164879: NORMAL
SODIUM SERPL-SCNC: 144 MMOL/L (ref 134–144)
TTG IGA SER-ACNC: <2 U/ML (ref 0–3)
TTG IGG SER-ACNC: <2 U/ML (ref 0–5)
URATE SERPL-MCNC: 6.1 MG/DL (ref 2.5–7.1)
WBC # BLD AUTO: 13.6 X10E3/UL (ref 3.4–10.8)

## 2018-08-01 LAB
BACTERIA UR CULT: NORMAL
C DIFF TOX A+B STL QL IA: NEGATIVE

## 2018-08-01 NOTE — PROGRESS NOTES
Notify Mckenna Alvarado, the Vit D is normal  The RMSF, Lyme test is negative  The Gout test is normal, the muscle test is normal  The Lupus test is negative, the RA test is negative  One inflammation test is elevated but very unspecific, another one is normal

## 2018-08-04 LAB
CAMPYLOBACTER STL CULT: NORMAL
E COLI SXT STL QL IA: NEGATIVE
SALM + SHIG STL CULT: NORMAL

## 2018-08-06 ENCOUNTER — TELEPHONE (OUTPATIENT)
Dept: FAMILY MEDICINE CLINIC | Age: 63
End: 2018-08-06

## 2018-08-06 DIAGNOSIS — R14.0 BLOATING: ICD-10-CM

## 2018-08-06 DIAGNOSIS — R10.9 ABDOMINAL CRAMPING: ICD-10-CM

## 2018-08-06 DIAGNOSIS — R19.7 DIARRHEA, UNSPECIFIED TYPE: ICD-10-CM

## 2018-08-06 DIAGNOSIS — R10.819 LOWER ABDOMINAL TENDERNESS: ICD-10-CM

## 2018-08-06 DIAGNOSIS — R10.84 GENERALIZED ABDOMINAL PAIN: Primary | ICD-10-CM

## 2018-08-06 DIAGNOSIS — K57.90 DIVERTICULOSIS OF INTESTINE WITHOUT BLEEDING, UNSPECIFIED INTESTINAL TRACT LOCATION: ICD-10-CM

## 2018-08-06 NOTE — TELEPHONE ENCOUNTER
Spoke with pt. Some stool labs still pending. Pt reports mild improvement, meds helping, but still has a lot of discomfort/cramping/bloating. Stool has gone from water to \"ribbony\". Ordering CT to be done at [facility]. Pt agrees with plan.

## 2018-08-09 ENCOUNTER — DOCUMENTATION ONLY (OUTPATIENT)
Dept: FAMILY MEDICINE CLINIC | Age: 63
End: 2018-08-09

## 2018-08-09 NOTE — PROGRESS NOTES
There is a message in the staff message in regards to Noreen's CT can you please let me know what you wanted

## 2018-08-10 ENCOUNTER — TELEPHONE (OUTPATIENT)
Dept: FAMILY MEDICINE CLINIC | Age: 63
End: 2018-08-10

## 2018-08-10 DIAGNOSIS — R19.7 DIARRHEA, UNSPECIFIED TYPE: Primary | ICD-10-CM

## 2018-08-10 DIAGNOSIS — R10.9 ABDOMINAL CRAMPING: ICD-10-CM

## 2018-08-10 LAB
O+P SPEC MICRO: NORMAL
WBC STL QL MICRO: NORMAL

## 2018-08-10 NOTE — PROGRESS NOTES
Pt notified. sxs not as frequent but still present. Does not want to try Flagyl right now bc likes to have a glass or two at night of wine and I advised she should not do. She is aware of the indications. CT scheduled for Monday.

## 2018-08-14 DIAGNOSIS — R14.0 ABDOMINAL BLOATING: ICD-10-CM

## 2018-08-14 DIAGNOSIS — B37.31 VAGINAL YEAST INFECTION: Primary | ICD-10-CM

## 2018-08-14 DIAGNOSIS — R10.9 ABDOMINAL CRAMPING: ICD-10-CM

## 2018-08-14 DIAGNOSIS — R93.5 ABNORMAL ABDOMINAL CT SCAN: ICD-10-CM

## 2018-08-14 DIAGNOSIS — K52.9 IBD (INFLAMMATORY BOWEL DISEASE): Primary | ICD-10-CM

## 2018-08-14 DIAGNOSIS — R19.7 DIARRHEA, UNSPECIFIED TYPE: ICD-10-CM

## 2018-08-14 RX ORDER — FLUCONAZOLE 150 MG/1
150 TABLET ORAL DAILY
Qty: 1 TAB | Refills: 1 | Status: SHIPPED | OUTPATIENT
Start: 2018-08-14 | End: 2018-08-15

## 2018-08-14 NOTE — PROGRESS NOTES
Pt states abx gave yeast inf and would like Diflucan called in to her pharmacy. Has tolerated in past w/o any issues. Counseled pt on potential medication AEs/interactions.

## 2018-08-16 DIAGNOSIS — E78.2 MIXED HYPERLIPIDEMIA: Primary | ICD-10-CM

## 2018-08-16 DIAGNOSIS — I48.0 PAROXYSMAL ATRIAL FIBRILLATION (HCC): ICD-10-CM

## 2018-08-16 DIAGNOSIS — K21.9 GASTROESOPHAGEAL REFLUX DISEASE WITHOUT ESOPHAGITIS: ICD-10-CM

## 2018-08-16 DIAGNOSIS — F41.9 ANXIETY: ICD-10-CM

## 2018-08-16 LAB
ADENOVIRUS F 40/41: NOT DETECTED
ASTROVIRUS: NOT DETECTED
C DIFFICILE TOXIN A/B: NOT DETECTED
CAMPYLOBACTER: NOT DETECTED
CRYPTOSPORIDIUM, CRYPTOSPORIDIUM: NOT DETECTED
CYCLOSPORA CAYETANENSIS: NOT DETECTED
E COLI O157: NORMAL
ENTAMOEBA HISTOLYTICA: NOT DETECTED
ENTEROAGGREGATIVE E COLI: NOT DETECTED
ENTEROPATHOGENIC E COLI (EPEC), EPEC: NOT DETECTED
ENTEROTOXIGENIC E COLI (ETEC), ETEC: NOT DETECTED
GIARDIA LAMBLIA: NOT DETECTED
NOROVIRUS GI/GII: NOT DETECTED
PLESIOMONAS SHIGELLOIDES: NOT DETECTED
ROTAVIRUS A: NOT DETECTED
SALMONELLA: NOT DETECTED
SAPOVIRUS: NOT DETECTED
SHIGA-TOXIN-PRODUCING E COLI: NOT DETECTED
SHIGELLA/ENTEROINVASIVE E COLI (EIEC), EIEC: NOT DETECTED
VIBRIO CHOLERAE: NOT DETECTED
VIBRIO: NOT DETECTED
YERSINIA ENTEROCOLITICA: NOT DETECTED

## 2018-08-16 RX ORDER — ATENOLOL 50 MG/1
50 TABLET ORAL 2 TIMES DAILY
Qty: 180 TAB | Refills: 1 | Status: SHIPPED | OUTPATIENT
Start: 2018-08-16 | End: 2019-03-26 | Stop reason: SDUPTHER

## 2018-08-16 RX ORDER — RANITIDINE 150 MG/1
150 TABLET, FILM COATED ORAL
Qty: 90 TAB | Refills: 1 | Status: SHIPPED | OUTPATIENT
Start: 2018-08-16 | End: 2019-05-10 | Stop reason: SDUPTHER

## 2018-08-16 RX ORDER — PAROXETINE 10 MG/1
TABLET, FILM COATED ORAL
Qty: 90 TAB | Refills: 1 | Status: SHIPPED | OUTPATIENT
Start: 2018-08-16 | End: 2019-05-10 | Stop reason: SDUPTHER

## 2018-08-16 RX ORDER — ATORVASTATIN CALCIUM 10 MG/1
TABLET, FILM COATED ORAL
Qty: 90 TAB | Refills: 1 | Status: SHIPPED | OUTPATIENT
Start: 2018-08-16 | End: 2019-03-26 | Stop reason: SDUPTHER

## 2018-08-17 ENCOUNTER — CLINICAL SUPPORT (OUTPATIENT)
Dept: FAMILY MEDICINE CLINIC | Age: 63
End: 2018-08-17

## 2018-08-17 DIAGNOSIS — E78.2 MIXED HYPERLIPIDEMIA: ICD-10-CM

## 2018-08-18 LAB
CHOLEST SERPL-MCNC: 163 MG/DL (ref 100–199)
HDLC SERPL-MCNC: 58 MG/DL
INTERPRETATION, 910389: NORMAL
LDLC SERPL CALC-MCNC: 82 MG/DL (ref 0–99)
LDLC/HDLC SERPL: 1.4 RATIO (ref 0–3.2)
TRIGL SERPL-MCNC: 114 MG/DL (ref 0–149)
VLDLC SERPL CALC-MCNC: 23 MG/DL (ref 5–40)

## 2019-12-10 DIAGNOSIS — K21.9 GASTROESOPHAGEAL REFLUX DISEASE WITHOUT ESOPHAGITIS: ICD-10-CM

## 2019-12-10 RX ORDER — RANITIDINE 150 MG/1
150 TABLET, FILM COATED ORAL
Qty: 90 TAB | Refills: 1 | OUTPATIENT
Start: 2019-12-10

## 2019-12-10 NOTE — TELEPHONE ENCOUNTER
Patient has talked to Dr Sylvester Burnett and is aware of shortage of Ranitidine. An RX was sent for Pepcid for patient.

## 2020-01-13 DIAGNOSIS — J01.01 ACUTE RECURRENT MAXILLARY SINUSITIS: ICD-10-CM

## 2020-01-13 RX ORDER — FLUTICASONE PROPIONATE 50 MCG
SPRAY, SUSPENSION (ML) NASAL
Qty: 1 BOTTLE | Refills: 1 | Status: SHIPPED | OUTPATIENT
Start: 2020-01-13 | End: 2020-07-09

## 2021-04-13 ENCOUNTER — HOSPITAL ENCOUNTER (OUTPATIENT)
Dept: GENERAL RADIOLOGY | Age: 66
Discharge: HOME | End: 2021-04-13
Payer: COMMERCIAL

## 2021-04-13 ENCOUNTER — TELEPHONE (OUTPATIENT)
Dept: FAMILY MEDICINE CLINIC | Age: 66
End: 2021-04-13

## 2021-04-13 ENCOUNTER — OFFICE VISIT (OUTPATIENT)
Dept: FAMILY MEDICINE CLINIC | Age: 66
End: 2021-04-13

## 2021-04-13 VITALS
TEMPERATURE: 98.2 F | WEIGHT: 150 LBS | OXYGEN SATURATION: 98 % | RESPIRATION RATE: 16 BRPM | HEART RATE: 70 BPM | BODY MASS INDEX: 21.47 KG/M2 | DIASTOLIC BLOOD PRESSURE: 80 MMHG | SYSTOLIC BLOOD PRESSURE: 130 MMHG | HEIGHT: 70 IN

## 2021-04-13 DIAGNOSIS — L60.0 INGROWN NAIL OF GREAT TOE OF RIGHT FOOT: Primary | ICD-10-CM

## 2021-04-13 DIAGNOSIS — M25.572 ACUTE LEFT ANKLE PAIN: ICD-10-CM

## 2021-04-13 DIAGNOSIS — M25.572 ACUTE LEFT ANKLE PAIN: Primary | ICD-10-CM

## 2021-04-13 PROCEDURE — 73610 X-RAY EXAM OF ANKLE: CPT

## 2021-04-13 PROCEDURE — 1090F PRES/ABSN URINE INCON ASSESS: CPT | Performed by: FAMILY MEDICINE

## 2021-04-13 PROCEDURE — G8399 PT W/DXA RESULTS DOCUMENT: HCPCS | Performed by: FAMILY MEDICINE

## 2021-04-13 PROCEDURE — G8420 CALC BMI NORM PARAMETERS: HCPCS | Performed by: FAMILY MEDICINE

## 2021-04-13 PROCEDURE — 3017F COLORECTAL CA SCREEN DOC REV: CPT | Performed by: FAMILY MEDICINE

## 2021-04-13 PROCEDURE — G9899 SCRN MAM PERF RSLTS DOC: HCPCS | Performed by: FAMILY MEDICINE

## 2021-04-13 PROCEDURE — 11750 EXCISION NAIL&NAIL MATRIX: CPT | Performed by: FAMILY MEDICINE

## 2021-04-13 PROCEDURE — G8536 NO DOC ELDER MAL SCRN: HCPCS | Performed by: FAMILY MEDICINE

## 2021-04-13 PROCEDURE — G8427 DOCREV CUR MEDS BY ELIG CLIN: HCPCS | Performed by: FAMILY MEDICINE

## 2021-04-13 PROCEDURE — G8510 SCR DEP NEG, NO PLAN REQD: HCPCS | Performed by: FAMILY MEDICINE

## 2021-04-13 PROCEDURE — 1101F PT FALLS ASSESS-DOCD LE1/YR: CPT | Performed by: FAMILY MEDICINE

## 2021-04-13 PROCEDURE — 99212 OFFICE O/P EST SF 10 MIN: CPT | Performed by: FAMILY MEDICINE

## 2021-04-13 NOTE — PROGRESS NOTES
Concepcion Garcia is a 77 y.o. female who presents to the office today with the following:  Chief Complaint   Patient presents with    Ingrown Toenail     Right Ingrown Great Toe        HPI  Pt c/o of ingrown nail being very painful  No sign of infection  Pain with walking    ROS  See HPI. Past Medical History:   Diagnosis Date    Anxiety     Cataract     GERD (gastroesophageal reflux disease)     Glaucoma     open angle    HCVD (hypertensive cardiovascular disease)     Herpes labialis     Ill-defined condition     C3-6 cervical stenosis    Menopause     Mixed hyperlipidemia     Osteoporosis 12/15    RBBB (right bundle branch block)     Renal stones     SVT (supraventricular tachycardia) (HCC)        Past Surgical History:   Procedure Laterality Date    HX BLADDER REPAIR      HX BREAST BIOPSY Left     BENIGN    HX  SECTION      HX COLONOSCOPY      q5-7 y    HX CYST INCISION AND DRAINAGE      left breast, benign    HX HYSTERECTOMY      HX OOPHORECTOMY         Allergies   Allergen Reactions    Ciprofloxacin Anaphylaxis    Morphine Other (comments)     Delusions  Other reaction(s): Other (comments)  Delusions    Timolol Palpitations     Nausea    Povidone-Iodine Other (comments) and Rash     Burns her skin  Burns her skin       Current Outpatient Medications   Medication Sig    valACYclovir (Valtrex) 1 gram tablet Take 2 tablets bid for 1 d    famotidine (PEPCID) 20 mg tablet TAKE 1 TABLET BY MOUTH TWICE DAILY    atenoloL (TENORMIN) 50 mg tablet TAKE 1 TABLET BY MOUTH TWICE DAILY    levobunoloL (BETAGAN) 0.5 % ophthalmic solution     PARoxetine (PAXIL) 20 mg tablet TAKE 1 TABLET BY MOUTH ONCE DAILY    atorvastatin (LIPITOR) 10 mg tablet TAKE 1 TABLET DAILY    ibuprofen (MOTRIN) 200 mg tablet Take  by mouth every six (6) hours as needed.  latanoprost (XALATAN) 0.005 % ophthalmic solution     multivitamin (ONE A DAY) tablet Take 1 Tab by mouth every other day.     Calcium Carbonate-Vit D3-Min (CALTRATE 600+D PLUS MINERALS) 600 mg calcium- 400 unit Tab Take  by mouth two (2) times a day.  omega-3 fatty acids-vitamin e (FISH OIL) 1,000 mg cap Take 1 Cap by mouth daily. (Patient taking differently: Take 1,200 Caps by mouth daily. Takes 2 daily)     No current facility-administered medications for this visit. Social History     Socioeconomic History    Marital status:      Spouse name: Not on file    Number of children: Not on file    Years of education: Not on file    Highest education level: Not on file   Tobacco Use    Smoking status: Current Some Day Smoker     Packs/day: 0.50     Years: 35.00     Pack years: 17.50     Types: Cigarettes    Smokeless tobacco: Never Used   Substance and Sexual Activity    Alcohol use: Yes     Comment: Occasional    Drug use: No    Sexual activity: Yes     Partners: Male       Family History   Problem Relation Age of Onset    Diabetes Mother     Dementia Mother     Heart Disease Father         chf    Cancer Sister         lung ca         Physical Exam:  Visit Vitals  /80 (BP 1 Location: Left upper arm, BP Patient Position: Sitting, BP Cuff Size: Adult)   Pulse 70   Temp 98.2 °F (36.8 °C) (Oral)   Resp 16   Ht 5' 10\" (1.778 m)   Wt 150 lb (68 kg)   SpO2 98%   BMI 21.52 kg/m²     Physical Exam  Vitals signs and nursing note reviewed. HENT:      Head: Normocephalic and atraumatic. Eyes:      Extraocular Movements: Extraocular movements intact. Conjunctiva/sclera: Conjunctivae normal.   Pulmonary:      Effort: Pulmonary effort is normal.   Skin:     General: Skin is warm and dry. Comments: Right great toe nail ingrown on medial side, no sign of infection, no redness or swelling, but tenderness   Neurological:      Mental Status: She is alert and oriented to person, place, and time.    Psychiatric:         Mood and Affect: Mood normal.         Behavior: Behavior normal.       Consent obtained    Toe cleaned with ETOH and dressed in sterile fashion  Pt injected with 2.5 cc of 2% Lidocaine on both sides of the right toe  Wedge excision done on medial side of right great toe  Phenol used for destruction of nail bed  ABX ointment placed and toe dressed      Assessment/Plan:    ICD-10-CM ICD-9-CM    1.  Ingrown nail of great toe of right foot  L60.0 703.0 REMOVAL OF NAIL BED     Tylenol prn for pain  Monitor for infection  may take Keflex she still has at home        Dinorah Odell MD

## 2021-04-13 NOTE — PROGRESS NOTES
1. Have you been to the ER, urgent care clinic since your last visit? Hospitalized since your last visit? No    2. Have you seen or consulted any other health care providers outside of the [address] [place] [address] since your last visit? Include any pap smears or colon screening. No      Non-Provider Advance Care Planning (ACP) Note    Date of ACP Conversation: 4/13/2021  Persons included in Conversation: patient  Length of ACP Conversation in minutes: <16 minutes (Non-Billable)    Conversation requested by:   Patient    Authorized Decision Maker (if patient is incapable of making informed decisions): This person is:  Next of Kin by law (only applies in absence of a Healthcare Power of  or Legal Guardian)        General ACP for ALL Patients with Decision Making Capacity:    Advance Directive Conversation with Patients who have not yet planned:  Importance of advance care planning, including choosing a healthcare agent to communicate patient's healthcare decisions if patient lost the ability to make decisions, such as after a sudden illness or accident    Review of Existing Advance Directive: (Select questions covered)  \"What information were you given about medical decisions to consider before completing your advance directive? \" Conversation starter kit     Interventions Provided:  Provided ACP educational materials:  Conversation Starter Kit      [facility] NOTE        Chart reviewed for the following:   Lucia Loya LPN, have reviewed the History, Physical and updated the Allergic reactions for [place] 53 performed immediately prior to start of procedure:   Lucia Loya LPN, have performed the following reviews on [facility] prior to the start of the procedure:            * Patient was identified by name and date of birth   * Agreement on procedure being performed was verified  * Risks and Benefits explained to the patient by Puneet Livingston MD  * Procedure site verified and marked as necessary  * Patient was positioned for comfort  * Consent was signed and verified     Time: 5:00pm      Date of procedure: 4/13/2021    Procedure performed by:  Puneet Livingston MD    Provider assisted by: Cong Ward LPN    Patient assisted by: self    Pre Procedural Pain Scale: 8    Procedure start time:  5:30pm    Procedure end time:  5:45pn    How tolerated by patient: tolerated the procedure well with no complications    Post Procedural Pain Scale: 2 - Hurts Little Bit    Comments: none, Right Great Ingrown Toe     Post op instructions and patient education reviewed. Patient states understanding.     Copy of discharge instructions given to patient in AVS.

## 2021-04-13 NOTE — TELEPHONE ENCOUNTER
Pt twisted ankle on the left side  Now swollen and painful and tender    On exam tender over lateral maleolus

## 2021-05-24 ENCOUNTER — TELEPHONE (OUTPATIENT)
Dept: FAMILY MEDICINE CLINIC | Age: 66
End: 2021-05-24

## 2021-05-24 RX ORDER — VALACYCLOVIR HYDROCHLORIDE 1 G/1
TABLET, FILM COATED ORAL
Qty: 6 TABLET | Refills: 3 | Status: SHIPPED | OUTPATIENT
Start: 2021-05-24

## 2021-06-16 ENCOUNTER — OFFICE VISIT (OUTPATIENT)
Dept: FAMILY MEDICINE CLINIC | Age: 66
End: 2021-06-16
Payer: MEDICARE

## 2021-06-16 VITALS
DIASTOLIC BLOOD PRESSURE: 72 MMHG | TEMPERATURE: 97.6 F | SYSTOLIC BLOOD PRESSURE: 116 MMHG | HEIGHT: 70 IN | WEIGHT: 150 LBS | HEART RATE: 68 BPM | RESPIRATION RATE: 12 BRPM | OXYGEN SATURATION: 97 % | BODY MASS INDEX: 21.47 KG/M2

## 2021-06-16 DIAGNOSIS — C44.729 SQUAMOUS CELL CARCINOMA, LEG, LEFT: ICD-10-CM

## 2021-06-16 DIAGNOSIS — C44.719 BASAL CELL CARCINOMA, LEG, LEFT: Primary | ICD-10-CM

## 2021-06-16 PROCEDURE — 1101F PT FALLS ASSESS-DOCD LE1/YR: CPT | Performed by: FAMILY MEDICINE

## 2021-06-16 PROCEDURE — G8420 CALC BMI NORM PARAMETERS: HCPCS | Performed by: FAMILY MEDICINE

## 2021-06-16 PROCEDURE — 11603 EXC TR-EXT MAL+MARG 2.1-3 CM: CPT | Performed by: FAMILY MEDICINE

## 2021-06-16 PROCEDURE — 1090F PRES/ABSN URINE INCON ASSESS: CPT | Performed by: FAMILY MEDICINE

## 2021-06-16 PROCEDURE — G8427 DOCREV CUR MEDS BY ELIG CLIN: HCPCS | Performed by: FAMILY MEDICINE

## 2021-06-16 PROCEDURE — G9899 SCRN MAM PERF RSLTS DOC: HCPCS | Performed by: FAMILY MEDICINE

## 2021-06-16 PROCEDURE — G8399 PT W/DXA RESULTS DOCUMENT: HCPCS | Performed by: FAMILY MEDICINE

## 2021-06-16 PROCEDURE — G8510 SCR DEP NEG, NO PLAN REQD: HCPCS | Performed by: FAMILY MEDICINE

## 2021-06-16 PROCEDURE — G8536 NO DOC ELDER MAL SCRN: HCPCS | Performed by: FAMILY MEDICINE

## 2021-06-16 PROCEDURE — 99213 OFFICE O/P EST LOW 20 MIN: CPT | Performed by: FAMILY MEDICINE

## 2021-06-16 PROCEDURE — 3017F COLORECTAL CA SCREEN DOC REV: CPT | Performed by: FAMILY MEDICINE

## 2021-06-16 NOTE — PROGRESS NOTES
1. Have you been to the ER, urgent care clinic since your last visit? Hospitalized since your last visit? No    2. Have you seen or consulted any other health care providers outside of the [address] [place] [address] since your last visit? Include any pap smears or colon screening. No     [address] [facility]  OFFICE PROCEDURE PROGRESS NOTE        Chart reviewed for the following:   Fanny Wheeler LPN, have reviewed the History, Physical and updated the Allergic reactions for Raydell May performed immediately prior to start of procedure:   Fanny Wheeler LPN, have performed the following reviews on Charchey Madrigal prior to the start of the procedure:            * Patient was identified by name and date of birth   * Agreement on procedure being performed was verified  * Risks and Benefits explained to the patient by Makenna Lawson MD  * Procedure site verified and marked as necessary  * Patient was positioned for comfort  * Consent was signed and verified     Time: 12:50PM      Date of procedure: 6/16/2021    Procedure performed by:  Makenna Lawson MD    Provider assisted by: Adolfo Wright LPN    Patient assisted by: self    Pre Procedural Pain Scale: 0    Procedure start time:  1:00pm    Procedure end time:  130    How tolerated by patient: tolerated the procedure well with no complications    Post Procedural Pain Scale: 0 - No Hurt    Comments: none    Post op instructions and patient education reviewed. Patient states understanding.     Copy of discharge instructions given to patient in AVS.

## 2021-06-16 NOTE — PROGRESS NOTES
6/16/2021      Chief Complaint   Patient presents with    Skin Problem     Front & Back of left knee skin nodule          History of Present Illness:         is a 74 y.o. female  With growing nodular lesion on lateral upper L calf. Significant growth in last month      Allergies   Allergen Reactions    Ciprofloxacin Anaphylaxis    Morphine Other (comments)     Delusions  Other reaction(s): Other (comments)  Delusions    Timolol Palpitations     Nausea    Povidone-Iodine Other (comments) and Rash     Burns her skin  Burns her skin       Current Outpatient Medications   Medication Sig    valACYclovir (Valtrex) 1 gram tablet Take 2 tablets bid for 1 d    famotidine (PEPCID) 20 mg tablet TAKE 1 TABLET BY MOUTH TWICE DAILY    atenoloL (TENORMIN) 50 mg tablet TAKE 1 TABLET BY MOUTH TWICE DAILY    levobunoloL (BETAGAN) 0.5 % ophthalmic solution     PARoxetine (PAXIL) 20 mg tablet TAKE 1 TABLET BY MOUTH ONCE DAILY    atorvastatin (LIPITOR) 10 mg tablet TAKE 1 TABLET DAILY    ibuprofen (MOTRIN) 200 mg tablet Take  by mouth every six (6) hours as needed.  latanoprost (XALATAN) 0.005 % ophthalmic solution     multivitamin (ONE A DAY) tablet Take 1 Tab by mouth every other day.  Calcium Carbonate-Vit D3-Min (CALTRATE 600+D PLUS MINERALS) 600 mg calcium- 400 unit Tab Take  by mouth two (2) times a day.  omega-3 fatty acids-vitamin e (FISH OIL) 1,000 mg cap Take 1 Cap by mouth daily. (Patient taking differently: Take 1,200 Caps by mouth daily. Takes 2 daily)     No current facility-administered medications for this visit. Physical Examination:    Visit Vitals  /72 (BP 1 Location: Left upper arm, BP Patient Position: Sitting, BP Cuff Size: Adult)   Pulse 68   Temp 97.6 °F (36.4 °C) (Oral)   Resp 12   Ht 5' 10\" (1.778 m)   Wt 150 lb (68 kg)   SpO2 97%   BMI 21.52 kg/m²      General:  Alert, cooperative, no distress. Extremities: Extremities normal, atraumatic, no cyanosis or edema. Pulses: 2+ and symmetric all extremities. Skin: 2.1 cm nodular lesion with central erosion, slightly raised margins   Lymph nodes: Cervical, supraclavicular, and axillary nodes normal.   Neurologic: CNII-XII intact. Normal strength, sensation, and reflexes throughout. ASSESSMENT AND PLAN    1. Basal cell carcinoma, leg, left  fol informed consent and sterile prep, lesion infiltrated with 1% lidocaine with epinephrine, elliptical excision performed with #11 scalpel with visibly clear margins. Hemostasis with electrocautery. Site closed with eight 4-0 ethilon sutures, pressure dressing applied. Return in 10-14 days for suture removal and pathology review. - EXC SKIN MALIG 2.1-3CM TRUNK,ARM,LEG            No orders of the defined types were placed in this encounter.           Makenna Lawson MD

## 2021-06-22 LAB
CPT CODES, 490044: NORMAL
CPT DISCLAIMER: NORMAL
CYTOLOGY SPEC DOC CYTO: NORMAL
DIAGNOSIS SYNOPSIS:: NORMAL
DX ICD CODE: NORMAL
DX ICD CODE: NORMAL
PATH REPORT.COMMENTS IMP SPEC: NORMAL
PATH REPORT.GROSS SPEC: NORMAL
PATH REPORT.RELEVANT HX SPEC: NORMAL
PATHOLOGIST NAME: NORMAL
SPECIMEN SOURCE: NORMAL

## 2021-08-09 DIAGNOSIS — E78.2 MIXED HYPERLIPIDEMIA: Primary | ICD-10-CM

## 2021-08-09 DIAGNOSIS — E55.9 VITAMIN D DEFICIENCY, UNSPECIFIED: ICD-10-CM

## 2021-08-10 ENCOUNTER — LAB ONLY (OUTPATIENT)
Dept: FAMILY MEDICINE CLINIC | Age: 66
End: 2021-08-10

## 2021-08-10 DIAGNOSIS — E78.2 MIXED HYPERLIPIDEMIA: ICD-10-CM

## 2021-08-10 DIAGNOSIS — E55.9 VITAMIN D DEFICIENCY, UNSPECIFIED: ICD-10-CM

## 2021-08-12 LAB
25(OH)D3 SERPL-MCNC: 37.5 NG/ML (ref 30–100)
ALBUMIN SERPL-MCNC: 4.3 G/DL (ref 3.5–5)
ALBUMIN/GLOB SERPL: 1.5 {RATIO} (ref 1.1–2.2)
ALP SERPL-CCNC: 112 U/L (ref 45–117)
ALT SERPL-CCNC: 29 U/L (ref 12–78)
ANION GAP SERPL CALC-SCNC: 5 MMOL/L (ref 5–15)
AST SERPL-CCNC: 20 U/L (ref 15–37)
BASOPHILS # BLD: 0.2 K/UL (ref 0–0.1)
BASOPHILS NFR BLD: 2 % (ref 0–1)
BILIRUB SERPL-MCNC: 0.5 MG/DL (ref 0.2–1)
BUN SERPL-MCNC: 13 MG/DL (ref 6–20)
BUN/CREAT SERPL: 18 (ref 12–20)
CALCIUM SERPL-MCNC: 9.8 MG/DL (ref 8.5–10.1)
CHLORIDE SERPL-SCNC: 106 MMOL/L (ref 97–108)
CHOLEST SERPL-MCNC: 185 MG/DL
CO2 SERPL-SCNC: 30 MMOL/L (ref 21–32)
CREAT SERPL-MCNC: 0.71 MG/DL (ref 0.55–1.02)
DIFFERENTIAL METHOD BLD: ABNORMAL
EOSINOPHIL # BLD: 0.4 K/UL (ref 0–0.4)
EOSINOPHIL NFR BLD: 5 % (ref 0–7)
ERYTHROCYTE [DISTWIDTH] IN BLOOD BY AUTOMATED COUNT: 13.2 % (ref 11.5–14.5)
GLOBULIN SER CALC-MCNC: 2.9 G/DL (ref 2–4)
GLUCOSE SERPL-MCNC: 96 MG/DL (ref 65–100)
HCT VFR BLD AUTO: 45.2 % (ref 35–47)
HDLC SERPL-MCNC: 63 MG/DL
HDLC SERPL: 2.9 {RATIO} (ref 0–5)
HGB BLD-MCNC: 13.8 G/DL (ref 11.5–16)
IMM GRANULOCYTES # BLD AUTO: 0 K/UL
IMM GRANULOCYTES NFR BLD AUTO: 0 %
LDLC SERPL CALC-MCNC: 84.2 MG/DL (ref 0–100)
LYMPHOCYTES # BLD: 2 K/UL (ref 0.8–3.5)
LYMPHOCYTES NFR BLD: 24 % (ref 12–49)
MCH RBC QN AUTO: 33.8 PG (ref 26–34)
MCHC RBC AUTO-ENTMCNC: 30.5 G/DL (ref 30–36.5)
MCV RBC AUTO: 110.8 FL (ref 80–99)
MONOCYTES # BLD: 0.9 K/UL (ref 0–1)
MONOCYTES NFR BLD: 11 % (ref 5–13)
NEUTS BAND NFR BLD MANUAL: 3 % (ref 0–6)
NEUTS SEG # BLD: 4.9 K/UL (ref 1.8–8)
NEUTS SEG NFR BLD: 55 % (ref 32–75)
NRBC # BLD: 0 K/UL (ref 0–0.01)
NRBC BLD-RTO: 0 PER 100 WBC
PLATELET # BLD AUTO: 344 K/UL (ref 150–400)
PMV BLD AUTO: 10 FL (ref 8.9–12.9)
POTASSIUM SERPL-SCNC: 4.2 MMOL/L (ref 3.5–5.1)
PROT SERPL-MCNC: 7.2 G/DL (ref 6.4–8.2)
RBC # BLD AUTO: 4.08 M/UL (ref 3.8–5.2)
RBC MORPH BLD: ABNORMAL
SODIUM SERPL-SCNC: 141 MMOL/L (ref 136–145)
TRIGL SERPL-MCNC: 189 MG/DL (ref ?–150)
VLDLC SERPL CALC-MCNC: 37.8 MG/DL
WBC # BLD AUTO: 8.4 K/UL (ref 3.6–11)
WBC MORPH BLD: ABNORMAL

## 2021-08-19 DIAGNOSIS — F41.9 ANXIETY: ICD-10-CM

## 2021-08-19 RX ORDER — PAROXETINE HYDROCHLORIDE 20 MG/1
20 TABLET, FILM COATED ORAL DAILY
Qty: 90 TABLET | Refills: 1 | Status: SHIPPED | OUTPATIENT
Start: 2021-08-19 | End: 2021-11-22

## 2021-09-23 ENCOUNTER — LAB ONLY (OUTPATIENT)
Dept: FAMILY MEDICINE CLINIC | Age: 66
End: 2021-09-23

## 2021-09-23 DIAGNOSIS — Z20.822 CONTACT WITH AND (SUSPECTED) EXPOSURE TO COVID-19: Primary | ICD-10-CM

## 2021-09-23 DIAGNOSIS — R05.9 COUGH: ICD-10-CM

## 2021-09-25 LAB
SARS-COV-2, NAA 2 DAY TAT: NORMAL
SARS-COV-2, NAA: NOT DETECTED

## 2021-10-07 ENCOUNTER — CLINICAL SUPPORT (OUTPATIENT)
Dept: FAMILY MEDICINE CLINIC | Age: 66
End: 2021-10-07
Payer: COMMERCIAL

## 2021-10-07 DIAGNOSIS — Z23 ENCOUNTER FOR IMMUNIZATION: Primary | ICD-10-CM

## 2021-10-07 PROCEDURE — 90471 IMMUNIZATION ADMIN: CPT | Performed by: FAMILY MEDICINE

## 2021-10-07 PROCEDURE — 90694 VACC AIIV4 NO PRSRV 0.5ML IM: CPT | Performed by: FAMILY MEDICINE

## 2021-11-22 DIAGNOSIS — F41.9 ANXIETY: ICD-10-CM

## 2021-11-22 RX ORDER — PAROXETINE HYDROCHLORIDE 20 MG/1
30 TABLET, FILM COATED ORAL DAILY
Qty: 135 TABLET | Refills: 1 | Status: SHIPPED | OUTPATIENT
Start: 2021-11-22 | End: 2022-08-29

## 2022-01-31 ENCOUNTER — OFFICE VISIT (OUTPATIENT)
Dept: FAMILY MEDICINE CLINIC | Age: 67
End: 2022-01-31
Payer: COMMERCIAL

## 2022-01-31 ENCOUNTER — HOSPITAL ENCOUNTER (OUTPATIENT)
Dept: GENERAL RADIOLOGY | Age: 67
Discharge: HOME | End: 2022-01-31
Payer: COMMERCIAL

## 2022-01-31 VITALS
DIASTOLIC BLOOD PRESSURE: 84 MMHG | TEMPERATURE: 97.3 F | WEIGHT: 150 LBS | BODY MASS INDEX: 21.47 KG/M2 | RESPIRATION RATE: 16 BRPM | OXYGEN SATURATION: 97 % | HEIGHT: 70 IN | SYSTOLIC BLOOD PRESSURE: 135 MMHG | HEART RATE: 67 BPM

## 2022-01-31 DIAGNOSIS — Z00.00 MEDICARE ANNUAL WELLNESS VISIT, SUBSEQUENT: Primary | ICD-10-CM

## 2022-01-31 DIAGNOSIS — S79.911A HIP INJURY, RIGHT, INITIAL ENCOUNTER: ICD-10-CM

## 2022-01-31 DIAGNOSIS — I48.0 PAROXYSMAL ATRIAL FIBRILLATION (HCC): ICD-10-CM

## 2022-01-31 DIAGNOSIS — M54.41 ACUTE BILATERAL LOW BACK PAIN WITH RIGHT-SIDED SCIATICA: ICD-10-CM

## 2022-01-31 PROCEDURE — G0439 PPPS, SUBSEQ VISIT: HCPCS | Performed by: FAMILY MEDICINE

## 2022-01-31 PROCEDURE — 73502 X-RAY EXAM HIP UNI 2-3 VIEWS: CPT

## 2022-01-31 PROCEDURE — 99213 OFFICE O/P EST LOW 20 MIN: CPT | Performed by: FAMILY MEDICINE

## 2022-01-31 RX ORDER — CYCLOBENZAPRINE HCL 5 MG
5 TABLET ORAL
Qty: 30 TABLET | Refills: 0 | Status: SHIPPED | OUTPATIENT
Start: 2022-01-31

## 2022-01-31 NOTE — PROGRESS NOTES
Itz Lawrence is a 74 y.o. female who presents to the office today with the following:  Chief Complaint   Patient presents with    Back Pain    Hip Pain     Right Hip Pain     Spasms       HPI  HM reviewed     Hx PAfib occasionally   But recently diagnosed as SVT  Atenolol helped  No Syncopy  occ cough      Back and hip pain started a tenderago  This weekend worse  Fell 2 w ago on that side  Could not get out of bed  Right lateral hip 10/10  Radiating down the leg to the ankle  Have been walking on it  Nothing makes it worse or better but Ibuprofen and heat  And radiation up into back  Spasm in waist area  Coughing at night, dry, increasing the spasms        ROS  See HPI. Past Medical History:   Diagnosis Date    Anxiety     Cataract     GERD (gastroesophageal reflux disease)     Glaucoma     open angle    HCVD (hypertensive cardiovascular disease)     Herpes labialis     Ill-defined condition     C3-6 cervical stenosis    Menopause     Mixed hyperlipidemia     Osteoporosis 12/15    RBBB (right bundle branch block)     Renal stones     SVT [facility]supraventricular tachycardia) ([facility])        Past Surgical History:   Procedure Laterality Date    HX BLADDER REPAIR      HX BREAST BIOPSY Left     BENIGN    HX  SECTION      HX COLONOSCOPY      q5-7 y    HX CYST INCISION AND DRAINAGE      left breast, benign    HX HYSTERECTOMY      HX OOPHORECTOMY         Allergies   Allergen Reactions    Ciprofloxacin Anaphylaxis    Morphine Other (comments)     Delusions  Other reaction(s): Other (comments)  Delusions    Timolol Palpitations     Nausea    Povidone-Iodine Other (comments) and Rash     Burns her skin  Burns her skin       Current Outpatient Medications   Medication Sig    cyclobenzaprine (FLEXERIL) 5 mg tablet Take 1 Tablet by mouth three (3) times daily as needed for Muscle Spasm(s).     atorvastatin (LIPITOR) 10 mg tablet TAKE 1 TABLET BY MOUTH ONCE DAILY    PARoxetine (PAXIL) 20 mg tablet Take 1.5 Tablets by mouth daily.  famotidine (PEPCID) 20 mg tablet TAKE 1 TABLET BY MOUTH TWICE DAILY    atenoloL (TENORMIN) 50 mg tablet TAKE 1 TABLET BY MOUTH TWICE DAILY    valACYclovir (Valtrex) 1 gram tablet Take 2 tablets bid for 1 d    levobunoloL (BETAGAN) 0.5 % ophthalmic solution     ibuprofen (MOTRIN) 200 mg tablet Take  by mouth every six (6) hours as needed.  latanoprost (XALATAN) 0.005 % ophthalmic solution     multivitamin (ONE A DAY) tablet Take 1 Tab by mouth every other day.  Calcium Carbonate-Vit D3-Min (CALTRATE 600+D PLUS MINERALS) 600 mg calcium- 400 unit Tab Take  by mouth two (2) times a day.  omega-3 fatty acids-vitamin e (FISH OIL) 1,000 mg cap Take 1 Cap by mouth daily. (Patient taking differently: Take 1,200 Caps by mouth daily. Takes 2 daily)     No current facility-administered medications for this visit. Social History     Socioeconomic History    Marital status:    Tobacco Use    Smoking status: Current Some Day Smoker     Packs/day: 0.50     Years: 35.00     Pack years: 17.50     Types: Cigarettes    Smokeless tobacco: Never Used   Vaping Use    Vaping Use: Never used   Substance and Sexual Activity    Alcohol use: Yes     Comment: Occasional    Drug use: No    Sexual activity: Yes     Partners: Male       Family History   Problem Relation Age of Onset    Diabetes Mother     Dementia Mother     Heart Disease Father         chf    Cancer Sister         lung ca         Physical Exam:  Visit Vitals  /84 (BP 1 Location: Left upper arm, BP Patient Position: Sitting, BP Cuff Size: Adult)   Pulse 67   Temp 97.3 °F (36.3 °C) (Oral)   Resp 16   Ht 5' 10\" (1.778 m)   Wt 150 lb (68 kg)   SpO2 97%   BMI 21.52 kg/m²     Physical Exam  Vitals and nursing note reviewed. Constitutional:       Appearance: She is normal weight. HENT:      Head: Normocephalic and atraumatic. Eyes:      Extraocular Movements: Extraocular movements intact. Conjunctiva/sclera: Conjunctivae normal.   Pulmonary:      Effort: Pulmonary effort is normal.   Musculoskeletal:      Comments: No tenderness over the spine but tight paraspinal muscles and right lateral hip, normal ROM of right hip, normal strength in legs and normal gait   Neurological:      Mental Status: She is alert and oriented to person, place, and time. Psychiatric:         Mood and Affect: Mood normal.         Behavior: Behavior normal.         Assessment/Plan:    ICD-10-CM ICD-9-CM    1. Medicare annual wellness visit, subsequent  Z00.00 V70.0    2. Paroxysmal atrial fibrillation (HCC)  I48.0 427.31    3. Acute bilateral low back pain with right-sided sciatica  M54.41 724.2 cyclobenzaprine (FLEXERIL) 5 mg tablet     724.3      338.19    4.  Hip injury, right, initial encounter  S79.911A 959.6 XR HIP RT W OR WO PELV 2-3 VWS           Sheng Small MD

## 2022-01-31 NOTE — PATIENT INSTRUCTIONS

## 2022-01-31 NOTE — PROGRESS NOTES
1. Have you been to the ER, urgent care clinic since your last visit? Hospitalized since your last visit? No    2. Have you seen or consulted any other health care providers outside of the [address] [place] [address] [place] since your last visit? Include any pap smears or colon screening. No       Functional Ability:   Does the patient exhibit a steady gait? yes   How long did it take the patient to get up and walk from a sitting position? 10 seconds   Is the patient self reliant?  (ie can do own laundry, meals, household chores)  yes     Does the patient handle his/her own medications? yes     Does the patient handle his/her own money? yes     Is the patients home safe (ie good lighting, handrails on stairs and bath, etc.)? yes     Did you notice or did patient express any hearing difficulties? yes     Did you notice or did patient express any vision difficulties?   no     Were distance and reading eye charts used? yes       Advance Care Planning:   Patient was offered the opportunity to discuss advance care planning:  yes     Does patient have an Advance Directive:  yes   If no, did you provide information on Caring Connections? yes     ADL Assessment 2/18/2021   Feeding yourself No Help Needed   Getting from bed to chair No Help Needed   Getting dressed No Help Needed   Bathing or showering No Help Needed   Walk across the room (includes cane/walker) No Help Needed   Using the telphone No Help Needed   Taking your medications No Help Needed   Preparing meals No Help Needed   Managing money (expenses/bills) No Help Needed   Moderately strenuous housework (laundry) No Help Needed   Shopping for personal items (toiletries/medicines) No Help Needed   Shopping for groceries No Help Needed   Driving No Help Needed   Climbing a flight of stairs No Help Needed   Getting to places beyond walking distances No Help Needed       Abuse Screening Questionnaire 2/18/2021   Do you ever feel afraid of your partner?  N   Are you in a relationship with someone who physically or mentally threatens you? N   Is it safe for you to go home? Y       This is the Subsequent Medicare Annual Wellness Exam, performed 12 months or more after the Initial AWV or the last Subsequent AWV    I have reviewed the patient's medical history in detail and updated the computerized patient record. Assessment/Plan   Education and counseling provided:  Are appropriate based on today's review and evaluation    1. Medicare annual wellness visit, subsequent  2. Paroxysmal atrial fibrillation (HCC)       Depression Risk Factor Screening     3 most recent PHQ Screens 1/31/2022   PHQ Not Done -   Little interest or pleasure in doing things Not at all   Feeling down, depressed, irritable, or hopeless Not at all   Total Score PHQ 2 0   Trouble falling or staying asleep, or sleeping too much -   Feeling tired or having little energy -   Poor appetite, weight loss, or overeating -   Feeling bad about yourself - or that you are a failure or have let yourself or your family down -   Trouble concentrating on things such as school, work, reading, or watching TV -   Moving or speaking so slowly that other people could have noticed; or the opposite being so fidgety that others notice -   Thoughts of being better off dead, or hurting yourself in some way -   PHQ 9 Score -   How difficult have these problems made it for you to do your work, take care of your home and get along with others -       Alcohol & Drug Abuse Risk Screen    Do you average more than 1 drink per night or more than 7 drinks a week:  No    On any one occasion in the past three months have you have had more than 3 drinks containing alcohol:  No          Functional Ability and Level of Safety    Hearing: Hearing is good. Activities of Daily Living: The home contains: no safety equipment.   Patient does total self care      Ambulation: with no difficulty     Fall Risk:  Fall Risk Assessment, last 12 mths 2021   Able to walk? Yes   Fall in past 12 months? 0   Do you feel unsteady? 0   Are you worried about falling 0   Number of falls in past 12 months -   Fall with injury?  -      Abuse Screen:  Patient is not abused       Cognitive Screening    Has your family/caregiver stated any concerns about your memory: no     Cognitive Screening: Normal - Clock Drawing Test    Health Maintenance Due     Health Maintenance Due   Topic Date Due    COVID-19 Vaccine (3 - Booster for Tommie Scrape series) 2021       Patient Care Team   Patient Care Team:  Bethany Lozada MD as PCP - General (Family Medicine)  Beth[facility]ozada [facility]  Shelia Rogers MD (Family Medicine)  Indra Nicholson MD (Cardiology)    History     Patient Active Problem List   Diagnosis Code    Esophageal reflux K21.9    Anxiety F41.9    Diverticulosis of colon K57.30    Paroxysmal atrial fibrillation (HCC) I48.0    HCVD (hypertensive cardiovascular disease) I11.9    Mixed hyperlipidemia E78.2    RBBB (right bundle branch block) I45.10     Past Medical History:   Diagnosis Date    Anxiety     Cataract     GERD (gastroesophageal reflux disease)     Glaucoma     open angle    HCVD (hypertensive cardiovascular disease)     Herpes labialis     Ill-defined condition     C3-6 cervical stenosis    Menopause     Mixed hyperlipidemia     Osteoporosis 12/15    RBBB (right bundle branch block)     Renal stones     SVT (supraventricular tachycardia) (HCC)       Past Surgical History:   Procedure Laterality Date    HX BLADDER REPAIR      HX BREAST BIOPSY Left     BENIGN    HX  SECTION      HX COLONOSCOPY      q5-7 y    HX CYST INCISION AND DRAINAGE      left breast, benign    HX HYSTERECTOMY      HX OOPHORECTOMY       Current Outpatient Medications   Medication Sig Dispense Refill    atorvastatin (LIPITOR) 10 mg tablet TAKE 1 TABLET BY MOUTH ONCE DAILY 90 Tablet 2    PARoxetine (PAXIL) 20 mg tablet Take 1.5 Tablets by mouth daily. 135 Tablet 1    famotidine (PEPCID) 20 mg tablet TAKE 1 TABLET BY MOUTH TWICE DAILY 180 Tablet 1    atenoloL (TENORMIN) 50 mg tablet TAKE 1 TABLET BY MOUTH TWICE DAILY 180 Tablet 1    valACYclovir (Valtrex) 1 gram tablet Take 2 tablets bid for 1 d 6 Tablet 3    levobunoloL (BETAGAN) 0.5 % ophthalmic solution       ibuprofen (MOTRIN) 200 mg tablet Take  by mouth every six (6) hours as needed.  latanoprost (XALATAN) 0.005 % ophthalmic solution       multivitamin (ONE A DAY) tablet Take 1 Tab by mouth every other day.  Calcium Carbonate-Vit D3-Min (CALTRATE 600+D PLUS MINERALS) 600 mg calcium- 400 unit Tab Take  by mouth two (2) times a day.  omega-3 fatty acids-vitamin e (FISH OIL) 1,000 mg cap Take 1 Cap by mouth daily. (Patient taking differently: Take 1,200 Caps by mouth daily. Takes 2 daily) 90 Cap 3     Allergies   Allergen Reactions    Ciprofloxacin Anaphylaxis    Morphine Other (comments)     Delusions  Other reaction(s):  Other (comments)  Delusions    Timolol Palpitations     Nausea    Povidone-Iodine Other (comments) and Rash     Burns her skin  Burns her skin       Family History   Problem Relation Age of Onset    Diabetes Mother     Dementia Mother     Heart Disease Father         chf    Cancer Sister         lung ca     Social History     Tobacco Use    Smoking status: Current Some Day Smoker     Packs/day: 0.50     Years: 35.00     Pack years: 17.50     Types: Cigarettes    Smokeless tobacco: Never Used   Substance Use Topics    Alcohol use: Yes     Comment: Occasional         Ana Dunn LPN

## 2022-02-04 ENCOUNTER — OFFICE VISIT (OUTPATIENT)
Dept: FAMILY MEDICINE CLINIC | Age: 67
End: 2022-02-04
Payer: COMMERCIAL

## 2022-02-04 VITALS
HEART RATE: 69 BPM | WEIGHT: 150 LBS | TEMPERATURE: 97 F | SYSTOLIC BLOOD PRESSURE: 130 MMHG | DIASTOLIC BLOOD PRESSURE: 82 MMHG | BODY MASS INDEX: 21.47 KG/M2 | OXYGEN SATURATION: 97 % | HEIGHT: 70 IN | RESPIRATION RATE: 14 BRPM

## 2022-02-04 DIAGNOSIS — L60.0 INGROWN NAIL OF GREAT TOE OF LEFT FOOT: Primary | ICD-10-CM

## 2022-02-04 PROCEDURE — G8427 DOCREV CUR MEDS BY ELIG CLIN: HCPCS | Performed by: FAMILY MEDICINE

## 2022-02-04 PROCEDURE — 1101F PT FALLS ASSESS-DOCD LE1/YR: CPT | Performed by: FAMILY MEDICINE

## 2022-02-04 PROCEDURE — 1090F PRES/ABSN URINE INCON ASSESS: CPT | Performed by: FAMILY MEDICINE

## 2022-02-04 PROCEDURE — G8510 SCR DEP NEG, NO PLAN REQD: HCPCS | Performed by: FAMILY MEDICINE

## 2022-02-04 PROCEDURE — G8399 PT W/DXA RESULTS DOCUMENT: HCPCS | Performed by: FAMILY MEDICINE

## 2022-02-04 PROCEDURE — G8420 CALC BMI NORM PARAMETERS: HCPCS | Performed by: FAMILY MEDICINE

## 2022-02-04 PROCEDURE — 11730 AVULSION NAIL PLATE SIMPLE 1: CPT | Performed by: FAMILY MEDICINE

## 2022-02-04 PROCEDURE — 3017F COLORECTAL CA SCREEN DOC REV: CPT | Performed by: FAMILY MEDICINE

## 2022-02-04 PROCEDURE — G8536 NO DOC ELDER MAL SCRN: HCPCS | Performed by: FAMILY MEDICINE

## 2022-02-04 NOTE — PATIENT INSTRUCTIONS
Ingrown Toenail: Care Instructions  Your Care Instructions     An ingrown toenail often occurs because a nail is not trimmed correctly or because shoes are too tight. An ingrown nail can cause an infection. If your toe is infected, your doctor may prescribe antibiotics. Most ingrown toenails can be treated at home. You should trim toenails straight across, so the ends of the nail grow over the skin and not into it. Good nail care can prevent ingrown toenails. Follow-up care is a key part of your treatment and safety. Be sure to make and go to all appointments, and call your doctor if you are having problems. It's also a good idea to know your test results and keep a list of the medicines you take. How can you care for yourself at home? · Trim the nails straight across. Leave the corners a little longer so they do not cut into the skin. To do this when you have an ingrown nail:  ? Soak your foot in warm water for about 15 minutes to soften the nail. ? Wedge a small piece of wet cotton under the corner of the nail to cushion the nail and lift it slightly. This keeps it from cutting the skin. ? Repeat daily until the nail has grown out and can be trimmed. · Do not use manicure scissors to dig under the ingrown nail. You might stab your toe, which could get infected. · Do not trim your toenails too short. · Check with your doctor before trimming your own toenails if you have been diagnosed with diabetes or peripheral arterial disease. These conditions increase the risk of an infection, because you may have decreased sensation in your toes and cut yourself without knowing it. · Wear roomy, comfortable shoes. · If your doctor prescribed antibiotics, take them as directed. Do not stop taking them just because you feel better. You need to take the full course of antibiotics. When should you call for help?    Call your doctor now or seek immediate medical care if:    · You have signs of infection, such as:  ? Increased pain, swelling, warmth, or redness. ? Red streaks leading from the toe. ? Pus draining from the toe. ? A fever. Watch closely for changes in your health, and be sure to contact your doctor if:    · You do not get better as expected. Where can you learn more? Go to http://www.gray.com/  Enter R135 in the search box to learn more about \"Ingrown Toenail: Care Instructions. \"  Current as of: March 3, 2021               Content Version: 13.0  © 2006-2021 [employer]. Care instructions adapted under license by [employer] (which disclaims liability or warranty for this information). If you have questions about a medical condition or this instruction, always ask your healthcare professional. [address] any warranty or liability for your use of this information. Toenail or Fingernail Avulsion: Care Instructions  Your Care Instructions  Losing a toenail or fingernail because of an injury is called avulsion. The nail may be completely or partially torn off after a trauma to the area. Your doctor may have removed the nail, put part of it back into place, or repaired the nail bed. Your toe or finger may be sore after treatment. You may have stitches. You may have some swelling, color changes, and bloody crusting on or around the wound for 2 or 3 days. This is normal. Taking good care of your wound at home will help it heal quickly and reduce your chance of infection. The wound should heal within a few weeks. If completely removed, fingernails may take 6 months to grow back. Toenails may take 12 to 18 months to grow back. Injured nails may look different when they grow back. Follow-up care is a key part of your treatment and safety. Be sure to make and go to all appointments, and call your doctor if you are having problems. It's also a good idea to know your test results and keep a list of the medicines you take.   How can you care for yourself at home? · If possible, prop up the injured area on a pillow anytime you sit or lie down during the next 3 days. Try to keep it above the level of your heart. This will help reduce swelling. · Leave the bandage on, and if you have stitches, do not get them wet for the first 24 to 48 hours. Use a plastic bag to cover the area when you shower. · If your doctor told you how to care for your wound, follow your doctor's instructions. If you did not get instructions, follow this general advice:  ? After the first 24 to 48 hours, you can remove the bandage and gently wash around the wound with clean water 2 times a day. If the bandage sticks to the wound, use warm water to loosen it. Do not scrub or soak the area. ? You may cover the wound with a thin layer of petroleum jelly, such as Vaseline, and a nonstick bandage. ? Apply more petroleum jelly and replace the bandage as needed. · Do not go swimming. · If you have stitches, do not remove them on your own. Your doctor will tell you when to return to have the stitches removed. · Be safe with medicines. Take pain medicines exactly as directed. ? If the doctor gave you a prescription medicine for pain, take it as prescribed. ? If you are not taking a prescription pain medicine, ask your doctor if you can take an over-the-counter medicine. · If your doctor prescribed antibiotics, take them as directed. Do not stop taking them just because you feel better. You need to take the full course of antibiotics. When should you call for help? Call your doctor now or seek immediate medical care if:    · The skin near the wound is cool or pale or changes color.     · The wound starts to bleed, and blood soaks through the bandage. Oozing small amounts of blood is normal.     · You have signs of infection, such as:  ? Increased pain, swelling, warmth, or redness. ? Red streaks leading from your toe or finger.   ? Pus draining from your toe or finger. ? Swollen lymph nodes in your neck, armpits, or groin. ? A fever. Watch closely for changes in your health, and be sure to contact your doctor if:    · You have problems with the nail as it grows back.     · You do not get better as expected. Where can you learn more? Go to http://www.gray.com/  Enter T616 in the search box to learn more about \"Toenail or Fingernail Avulsion: Care Instructions. \"  Current as of: March 3, 2021               Content Version: 13.0  © 5909-2981 [employer]. Care instructions adapted under license by [employer] (which disclaims liability or warranty for this information). If you have questions about a medical condition or this instruction, always ask your healthcare professional. [address] any warranty or liability for your use of this information.

## 2022-02-04 NOTE — PROGRESS NOTES
2/4/2022      Chief Complaint   Patient presents with    Ingrown Toenail     Left great toe          History of Present Illness:         is a 74 y.o. female with ingrowth or lateral aspect of R great toe with pain. Previously excised, has recurred. Allergies   Allergen Reactions    Ciprofloxacin Anaphylaxis    Morphine Other (comments)     Delusions  Other reaction(s): Other (comments)  Delusions    Timolol Palpitations     Nausea    Povidone-Iodine Other (comments) and Rash     Burns her skin  Burns her skin       Current Outpatient Medications   Medication Sig    ZINC PO Take  by mouth.  cyclobenzaprine (FLEXERIL) 5 mg tablet Take 1 Tablet by mouth three (3) times daily as needed for Muscle Spasm(s).  atorvastatin (LIPITOR) 10 mg tablet TAKE 1 TABLET BY MOUTH ONCE DAILY    PARoxetine (PAXIL) 20 mg tablet Take 1.5 Tablets by mouth daily.  famotidine (PEPCID) 20 mg tablet TAKE 1 TABLET BY MOUTH TWICE DAILY    atenoloL (TENORMIN) 50 mg tablet TAKE 1 TABLET BY MOUTH TWICE DAILY    valACYclovir (Valtrex) 1 gram tablet Take 2 tablets bid for 1 d    levobunoloL (BETAGAN) 0.5 % ophthalmic solution     ibuprofen (MOTRIN) 200 mg tablet Take  by mouth every six (6) hours as needed.  latanoprost (XALATAN) 0.005 % ophthalmic solution     multivitamin (ONE A DAY) tablet Take 1 Tab by mouth every other day.  Calcium Carbonate-Vit D3-Min (CALTRATE 600+D PLUS MINERALS) 600 mg calcium- 400 unit Tab Take  by mouth two (2) times a day.  omega-3 fatty acids-vitamin e (FISH OIL) 1,000 mg cap Take 1 Cap by mouth daily. (Patient taking differently: Take 1,200 Caps by mouth daily. Takes 2 daily)     No current facility-administered medications for this visit.            Physical Examination:    Visit Vitals  /82 (BP 1 Location: Left upper arm, BP Patient Position: Sitting, BP Cuff Size: Adult)   Pulse 69   Temp 97 °F (36.1 °C) (Oral)   Resp 14   Ht 5' 10\" (1.778 m)   Wt 150 lb (68 kg) SpO2 97%   BMI 21.52 kg/m²      General:  Alert, cooperative, no distress. Extremities: Extremities normal, atraumatic, no cyanosis or edema. Tender lateral nailfold R great toe. Moderate ingrowth. Pulses: 2+ and symmetric all extremities. Skin: Skin color, texture, turgor normal. No rashes or lesions. Lymph nodes: Cervical, supraclavicular, and axillary nodes normal.   Neurologic: CNII-XII intact. Normal strength, sensation, and reflexes throughout. ASSESSMENT AND PLAN    1. Ingrown nail of great toe of left foot  fol informed consent and sterile prep, digital block performed and rubber band for hemostasis placed. Lateral 1/5 of nail removed with scissors, elevator and clippers. Removed to matrix and base and bed ablated with combination of phenol and electrocautery. Good hemostasis, sterile gauze placed. Orders Placed This Encounter    [id number] - REMOVAL OF NAIL PLATE    ZINC PO     Sig: Take  by mouth.            Arnold Wade MD

## 2022-02-04 NOTE — PROGRESS NOTES
1. Have you been to the ER, urgent care clinic since your last visit? Hospitalized since your last visit? No    2. Have you seen or consulted any other health care providers outside of the [address] [place] [address] since your last visit? Include any pap smears or colon screening. No     [address] [facility]  OFFICE PROCEDURE PROGRESS NOTE        Chart reviewed for the following:   Sherwin Ortiz LPN, have reviewed the History, Physical and updated the Allergic reactions for Wildo Angers performed immediately prior to start of procedure:   Sherwin Ortiz LPN, have performed the following reviews on Linda Rosario prior to the start of the procedure:            * Patient was identified by name and date of birth   * Agreement on procedure being performed was verified  * Risks and Benefits explained to the patient by Erica Rivas MD  * Procedure site verified and marked as necessary  * Patient was positioned for comfort  * Consent was signed and verified     Time: 4:20pm      Date of procedure: 2/4/2022    Procedure performed by:  Erica Rivas MD    Provider assisted by: April Fuentes LPN    Patient assisted by: self    Pre Procedural Pain Scale: 10    Procedure start time:  4:25pm    Procedure end time:  4:57pm    How tolerated by patient: tolerated the procedure well with no complications    Post Procedural Pain Scale: 0 - No Hurt    Comments: Left Great Ingrown Toe Removal     Post op instructions and patient education reviewed. Patient states understanding.     Copy of discharge instructions given to patient in S.

## 2022-02-08 RX ORDER — CEPHALEXIN 500 MG/1
500 CAPSULE ORAL 3 TIMES DAILY
Qty: 30 CAPSULE | Refills: 0 | Status: SHIPPED | OUTPATIENT
Start: 2022-02-08 | End: 2022-02-18

## 2022-03-10 ENCOUNTER — TELEPHONE (OUTPATIENT)
Dept: FAMILY MEDICINE CLINIC | Age: 67
End: 2022-03-10

## 2022-03-10 NOTE — TELEPHONE ENCOUNTER
I have attempted a PA on this medication but it looks like it was billed to Medicare rather then her Primary Gurpreet. This was because the primary insurance requires that the patient use [address] [facility] and there for is not covering the medication. I have discussed this with the patient and she is going to call the Mail order pharmacy and explain that she has 2 insurances and they will give her a rider to be able to get her medications locally.

## 2022-03-16 NOTE — TELEPHONE ENCOUNTER
Requested Prescriptions     Pending Prescriptions Disp Refills    latanoprost (XALATAN) 0.005 % ophthalmic solution

## 2022-03-17 RX ORDER — LATANOPROST 50 UG/ML
SOLUTION/ DROPS OPHTHALMIC
OUTPATIENT
Start: 2022-03-17

## 2022-03-18 ENCOUNTER — CLINICAL SUPPORT (OUTPATIENT)
Dept: FAMILY MEDICINE CLINIC | Age: 67
End: 2022-03-18
Payer: MEDICARE

## 2022-03-18 DIAGNOSIS — R10.9 FLANK PAIN: Primary | ICD-10-CM

## 2022-03-18 DIAGNOSIS — N30.90 CYSTITIS: Primary | ICD-10-CM

## 2022-03-18 LAB
BILIRUB UR QL STRIP: NEGATIVE
GLUCOSE UR-MCNC: NEGATIVE MG/DL
KETONES P FAST UR STRIP-MCNC: NEGATIVE MG/DL
PH UR STRIP: 7.5 [PH] (ref 4.6–8)
PROT UR QL STRIP: NORMAL
SP GR UR STRIP: 1.02 (ref 1–1.03)
UA UROBILINOGEN AMB POC: NORMAL (ref 0.2–1)
URINALYSIS CLARITY POC: NORMAL
URINALYSIS COLOR POC: YELLOW
URINE BLOOD POC: NORMAL
URINE LEUKOCYTES POC: NORMAL
URINE NITRITES POC: NEGATIVE

## 2022-03-18 PROCEDURE — 81002 URINALYSIS NONAUTO W/O SCOPE: CPT | Performed by: FAMILY MEDICINE

## 2022-03-18 RX ORDER — SULFAMETHOXAZOLE AND TRIMETHOPRIM 800; 160 MG/1; MG/1
1 TABLET ORAL 2 TIMES DAILY
Qty: 10 TABLET | Refills: 0 | Status: SHIPPED | OUTPATIENT
Start: 2022-03-18 | End: 2022-03-23

## 2022-06-29 ENCOUNTER — OFFICE VISIT (OUTPATIENT)
Dept: FAMILY MEDICINE CLINIC | Age: 67
End: 2022-06-29
Payer: COMMERCIAL

## 2022-06-29 DIAGNOSIS — L60.0 INGROWN NAIL OF GREAT TOE OF LEFT FOOT: Primary | ICD-10-CM

## 2022-06-29 PROCEDURE — G8399 PT W/DXA RESULTS DOCUMENT: HCPCS | Performed by: FAMILY MEDICINE

## 2022-06-29 PROCEDURE — G8536 NO DOC ELDER MAL SCRN: HCPCS | Performed by: FAMILY MEDICINE

## 2022-06-29 PROCEDURE — 1090F PRES/ABSN URINE INCON ASSESS: CPT | Performed by: FAMILY MEDICINE

## 2022-06-29 PROCEDURE — G8427 DOCREV CUR MEDS BY ELIG CLIN: HCPCS | Performed by: FAMILY MEDICINE

## 2022-06-29 PROCEDURE — 3017F COLORECTAL CA SCREEN DOC REV: CPT | Performed by: FAMILY MEDICINE

## 2022-06-29 PROCEDURE — G8420 CALC BMI NORM PARAMETERS: HCPCS | Performed by: FAMILY MEDICINE

## 2022-06-29 PROCEDURE — G8510 SCR DEP NEG, NO PLAN REQD: HCPCS | Performed by: FAMILY MEDICINE

## 2022-06-29 PROCEDURE — 1123F ACP DISCUSS/DSCN MKR DOCD: CPT | Performed by: FAMILY MEDICINE

## 2022-06-29 PROCEDURE — 99213 OFFICE O/P EST LOW 20 MIN: CPT | Performed by: FAMILY MEDICINE

## 2022-06-29 PROCEDURE — 11750 EXCISION NAIL&NAIL MATRIX: CPT | Performed by: FAMILY MEDICINE

## 2022-06-29 PROCEDURE — 1101F PT FALLS ASSESS-DOCD LE1/YR: CPT | Performed by: FAMILY MEDICINE

## 2022-06-29 NOTE — PATIENT INSTRUCTIONS
Nail and Nailbed: Anatomy Sketch    Current as of: November 15, 2021               Content Version: 13.2  © 2006-2022 [employer]. Care instructions adapted under license by [employer] (which disclaims liability or warranty for this information). If you have questions about a medical condition or this instruction, always ask your healthcare professional. [place] [address] any warranty or liability for your use of this information.

## 2022-06-29 NOTE — PROGRESS NOTES
6/29/2022      Chief Complaint   Patient presents with    Ingrown Toenail     Left Great Toe         History of Present Illness:         is a 69 y.o. female presents with medial L great toe pain. Had avulsion of lateral nail fold earlier this year. No drainage. Allergies   Allergen Reactions    Ciprofloxacin Anaphylaxis    Morphine Other (comments)     Delusions  Other reaction(s): Other (comments)  Delusions    Timolol Palpitations     Nausea    Povidone-Iodine Other (comments) and Rash     Burns her skin  Burns her skin       Current Outpatient Medications   Medication Sig    atenoloL (TENORMIN) 50 mg tablet TAKE 1 TABLET BY MOUTH 2 TIMES A DAY    ZINC PO Take  by mouth.  atorvastatin (LIPITOR) 10 mg tablet TAKE 1 TABLET BY MOUTH ONCE DAILY    PARoxetine (PAXIL) 20 mg tablet Take 1.5 Tablets by mouth daily.  famotidine (PEPCID) 20 mg tablet TAKE 1 TABLET BY MOUTH TWICE DAILY    valACYclovir (Valtrex) 1 gram tablet Take 2 tablets bid for 1 d    levobunoloL (BETAGAN) 0.5 % ophthalmic solution     ibuprofen (MOTRIN) 200 mg tablet Take  by mouth every six (6) hours as needed.  latanoprost (XALATAN) 0.005 % ophthalmic solution     multivitamin (ONE A DAY) tablet Take 1 Tab by mouth every other day.  Calcium Carbonate-Vit D3-Min (CALTRATE 600+D PLUS MINERALS) 600 mg calcium- 400 unit Tab Take  by mouth two (2) times a day.  omega-3 fatty acids-vitamin e (FISH OIL) 1,000 mg cap Take 1 Cap by mouth daily. (Patient taking differently: Take 1,200 Caps by mouth daily. Takes 2 daily)    cyclobenzaprine (FLEXERIL) 5 mg tablet Take 1 Tablet by mouth three (3) times daily as needed for Muscle Spasm(s). (Patient not taking: Reported on 6/29/2022)     No current facility-administered medications for this visit.            Physical Examination:    Visit Vitals  BP (P) 132/84 (BP 1 Location: Left upper arm, BP Patient Position: Sitting, BP Cuff Size: Adult)   Pulse (P) 67   Temp (P) 97 °F (36.1 °C) (Oral)   Resp (P) 14   Ht (P) 5' 10\" (1.778 m)   Wt (P) 150 lb (68 kg)   SpO2 (P) 97%   BMI (P) 21.52 kg/m²      General:  Alert, cooperative, no distress. Extremities: Extremities normal, atraumatic, no cyanosis or edema. Marked ingrowth of medial L great toenail   Pulses: 2+ and symmetric all extremities. Skin: Skin color, texture, turgor normal. No rashes or lesions. Lymph nodes: Cervical, supraclavicular, and axillary nodes normal.   Neurologic: CNII-XII intact. Normal strength, sensation, and reflexes throughout. ASSESSMENT AND PLAN    1. Ingrown nail of great toe of left foot  fol informed consent and sterile prep, digital block of L great toe administered and rubber band tourniquet applied. Medial 1/5 of nail removed to base and matrix ablated with combination of phenol and electrocautery. Sterile dressing applied, post op instructions provided  - REMOVAL OF NAIL BED            No orders of the defined types were placed in this encounter.           Laura Espitia MD

## 2022-06-29 NOTE — PROGRESS NOTES
1. \"Have you been to the ER, urgent care clinic since your last visit? Hospitalized since your last visit? \" No    2. \"Have you seen or consulted any other health care providers outside of the [address] [place] [address] since your last visit? \" No     3. For patients aged 39-70: Has the patient had a colonoscopy / FIT/ Cologuard? Yes - Care Gap present. Most recent result on file      If the patient is female:    4. For patients aged 41-77: Has the patient had a mammogram within the past 2 years? Yes - due 7/24/22      5. For patients aged 21-65: Has the patient had a pap smear? [address]  OFFICE PROCEDURE PROGRESS NOTE        Chart reviewed for the following:   Vanda Prado LPN, have reviewed the History, Physical and updated the Allergic reactions for [address] performed immediately prior to start of procedure:   Vanda Prado LPN, have performed the following reviews on Zen Chain prior to the start of the procedure:            * Patient was identified by name and date of birth   * Agreement on procedure being performed was verified  * Risks and Benefits explained to the patient by Riaz Cortes MD  * Procedure site verified and marked as necessary  * Patient was positioned for comfort  * Consent was signed and verified     Time: 4:25pm      Date of procedure: 6/29/2022    Procedure performed by:  Riaz Cortes MD    Provider assisted by: Matthew Lemus LPN    Patient assisted by: self    Pre Procedural Pain Scale: 6    Procedure start time:  4:40pm    Procedure end time:  5:00pm    How tolerated by patient: tolerated the procedure well with no complications    Post Procedural Pain Scale: 6 - Hurts Even More    Comments: Left Great Ingrown Toe    Post op instructions and patient education reviewed. Patient states understanding.     Copy of discharge instructions given to patient in AVS.

## 2022-07-15 RX ORDER — AMOXICILLIN 500 MG/1
500 CAPSULE ORAL 3 TIMES DAILY
Qty: 30 CAPSULE | Refills: 0 | Status: SHIPPED | OUTPATIENT
Start: 2022-07-15 | End: 2022-07-25

## 2022-07-15 NOTE — TELEPHONE ENCOUNTER
Would like an RX for Amoxicillin for her toe. It is very sore and oozes at times. Doesn't think it is raging, but it is trying. Send to the [facility]. Thank you.

## 2022-08-10 ENCOUNTER — OFFICE VISIT (OUTPATIENT)
Dept: FAMILY MEDICINE CLINIC | Age: 67
End: 2022-08-10
Payer: MEDICARE

## 2022-08-10 ENCOUNTER — HOSPITAL ENCOUNTER (OUTPATIENT)
Dept: LAB | Age: 67
Discharge: HOME | End: 2022-08-10

## 2022-08-10 VITALS
BODY MASS INDEX: 21.62 KG/M2 | WEIGHT: 151 LBS | DIASTOLIC BLOOD PRESSURE: 82 MMHG | HEART RATE: 68 BPM | TEMPERATURE: 97.1 F | RESPIRATION RATE: 14 BRPM | SYSTOLIC BLOOD PRESSURE: 128 MMHG | HEIGHT: 70 IN | OXYGEN SATURATION: 97 %

## 2022-08-10 DIAGNOSIS — C44.729 SQUAMOUS CELL CARCINOMA OF LEFT LOWER LEG: Primary | ICD-10-CM

## 2022-08-10 PROCEDURE — 99213 OFFICE O/P EST LOW 20 MIN: CPT | Performed by: FAMILY MEDICINE

## 2022-08-10 PROCEDURE — 11602 EXC TR-EXT MAL+MARG 1.1-2 CM: CPT | Performed by: FAMILY MEDICINE

## 2022-08-10 RX ORDER — VIT C/E/ZN/COPPR/LUTEIN/ZEAXAN 250MG-90MG
CAPSULE ORAL
COMMUNITY

## 2022-08-10 NOTE — PROGRESS NOTES
8/10/2022      Chief Complaint   Patient presents with    Cyst     Left Leg         History of Present Illness:         is a 69 y.o. female present with nodular growing lesion L anterior lower leg. No pigment, tender at times. Allergies   Allergen Reactions    Ciprofloxacin Anaphylaxis    Morphine Other (comments)     Delusions  Other reaction(s): Other (comments)  Delusions    Timolol Palpitations     Nausea    Povidone-Iodine Other (comments) and Rash     Burns her skin  Burns her skin       Current Outpatient Medications   Medication Sig    vit C,N-Tf-najbh-lutein-zeaxan (PreserVision AREDS-2) 250-90-40-1 mg cap capsule Take  by mouth. atenoloL (TENORMIN) 50 mg tablet TAKE 1 TABLET BY MOUTH 2 TIMES A DAY    ZINC PO Take  by mouth. atorvastatin (LIPITOR) 10 mg tablet TAKE 1 TABLET BY MOUTH ONCE DAILY    PARoxetine (PAXIL) 20 mg tablet Take 1.5 Tablets by mouth daily. famotidine (PEPCID) 20 mg tablet TAKE 1 TABLET BY MOUTH TWICE DAILY    valACYclovir (Valtrex) 1 gram tablet Take 2 tablets bid for 1 d    levobunoloL (BETAGAN) 0.5 % ophthalmic solution     ibuprofen (MOTRIN) 200 mg tablet Take  by mouth every six (6) hours as needed. latanoprost (XALATAN) 0.005 % ophthalmic solution     multivitamin (ONE A DAY) tablet Take 1 Tab by mouth every other day. Calcium Carbonate-Vit D3-Min 600 mg calcium- 400 unit tab Take  by mouth two (2) times a day. omega-3 fatty acids-vitamin e (FISH OIL) 1,000 mg cap Take 1 Cap by mouth daily. (Patient taking differently: Take 1,200 Capsules by mouth in the morning. Takes 2 daily)    cyclobenzaprine (FLEXERIL) 5 mg tablet Take 1 Tablet by mouth three (3) times daily as needed for Muscle Spasm(s). (Patient not taking: No sig reported)     No current facility-administered medications for this visit.            Physical Examination:    Visit Vitals  /82 (BP 1 Location: Left lower arm, BP Patient Position: Sitting, BP Cuff Size: Adult)   Pulse 68 Temp 97.1 °F (36.2 °C) (Oral)   Resp 14   Ht 5' 10\" (1.778 m)   Wt 151 lb (68.5 kg)   SpO2 97%   BMI 21.67 kg/m²      General:  Alert, cooperative, no distress. Skin: Skin color, texture, turgor normal. 1.2 cm firm nodular lesion anterior L lower leg   Lymph nodes: Cervical, supraclavicular, and axillary nodes normal.   Neurologic: CNII-XII intact. Normal strength, sensation, and reflexes throughout. ASSESSMENT AND PLAN    1. Basal cell carcinoma of left lower leg  Fol informed consent and sterile prep, area anesthetized with 1% lidocaine. Lesion excised elliptically. Hemostasis achieved with electrocautery and defect closed with eight 4-0 elthilon sutures. Sterile dressing applied. Suture removal 12-14 days  - EXC SKIN MALIG 1.1-2CM TRUNK,ARM,LEG  - SURGICAL PATHOLOGY; Future              Orders Placed This Encounter    [id number] - EXC SKIN MALIG 1.1-2CM TRUNK,ARM,LEG    vit C,V-Ia-cswcz-lutein-zeaxan (PreserVision AREDS-2) 250-90-40-1 mg cap capsule     Sig: Take  by mouth. SURGICAL PATHOLOGY     Standing Status:   Future     Standing Expiration Date:   2/10/2023     Order Specific Question:   Type of Specimen and Locations?      Answer:   nodule L lower leg     Order Specific Question:   Patient's clinical history:     Answer:   growing nodular lesion L leg           Ilya Christopher MD

## 2022-08-10 NOTE — PROGRESS NOTES
1. \"Have you been to the ER, urgent care clinic since your last visit? Hospitalized since your last visit? \" No    2. \"Have you seen or consulted any other health care providers outside of the [address] [place] [address] since your last visit? \" No     3. For patients aged 39-70: Has the patient had a colonoscopy / FIT/ Cologuard? Yes - no Care Gap present      If the patient is female:    4. For patients aged 41-77: Has the patient had a mammogram within the past 2 years? No      5. For patients aged 21-65: Has the patient had a pap smear? No      [address] [facility]  OFFICE PROCEDURE PROGRESS NOTE        Chart reviewed for the following:   Finn Altman LPN, have reviewed the History, Physical and updated the Allergic reactions for Valetta Brazil performed immediately prior to start of procedure:   Finn Altman LPN, have performed the following reviews on Joy Emerson prior to the start of the procedure:            * Patient was identified by name and date of birth   * Agreement on procedure being performed was verified  * Risks and Benefits explained to the patient by Griselda Sitter, MD  * Procedure site verified and marked as necessary  * Patient was positioned for comfort  * Consent was signed and verified     Time: 11:15am      Date of procedure: 8/10/2022    Procedure performed by:  Griselda Sitter, MD    Provider assisted by: Lani Tran LPN    Patient assisted by: self    Pre Procedural Pain Scale: 2    Procedure start time:  11:22am     Procedure end time:  11:58am    How tolerated by patient: tolerated the procedure well with no complications    Post Procedural Pain Scale:  3      Comments: Possible Basal Cell Carcinoma     Post op instructions and patient education reviewed. Patient states understanding.     Copy of discharge instructions given to patient in AVS.

## 2022-08-30 ENCOUNTER — OFFICE VISIT (OUTPATIENT)
Dept: FAMILY MEDICINE CLINIC | Age: 67
End: 2022-08-30
Payer: MEDICARE

## 2022-08-30 VITALS
SYSTOLIC BLOOD PRESSURE: 120 MMHG | OXYGEN SATURATION: 97 % | HEART RATE: 66 BPM | RESPIRATION RATE: 16 BRPM | TEMPERATURE: 97.5 F | DIASTOLIC BLOOD PRESSURE: 80 MMHG

## 2022-08-30 DIAGNOSIS — E78.2 MIXED HYPERLIPIDEMIA: Primary | ICD-10-CM

## 2022-08-30 DIAGNOSIS — F41.9 ANXIETY: ICD-10-CM

## 2022-08-30 DIAGNOSIS — I47.1 SVT (SUPRAVENTRICULAR TACHYCARDIA) (HCC): ICD-10-CM

## 2022-08-30 DIAGNOSIS — R30.0 DYSURIA: ICD-10-CM

## 2022-08-30 DIAGNOSIS — Z12.31 ENCOUNTER FOR SCREENING MAMMOGRAM FOR MALIGNANT NEOPLASM OF BREAST: ICD-10-CM

## 2022-08-30 DIAGNOSIS — E55.9 VITAMIN D DEFICIENCY, UNSPECIFIED: ICD-10-CM

## 2022-08-30 LAB
BILIRUB UR QL STRIP: NEGATIVE
GLUCOSE UR-MCNC: NEGATIVE MG/DL
KETONES P FAST UR STRIP-MCNC: NEGATIVE MG/DL
PH UR STRIP: 6 [PH] (ref 4.6–8)
PROT UR QL STRIP: NORMAL
SP GR UR STRIP: 1.03 (ref 1–1.03)
UA UROBILINOGEN AMB POC: NORMAL (ref 0.2–1)
URINALYSIS CLARITY POC: CLEAR
URINALYSIS COLOR POC: YELLOW
URINE BLOOD POC: NORMAL
URINE LEUKOCYTES POC: NORMAL
URINE NITRITES POC: NEGATIVE

## 2022-08-30 PROCEDURE — 36415 COLL VENOUS BLD VENIPUNCTURE: CPT | Performed by: FAMILY MEDICINE

## 2022-08-30 PROCEDURE — 99214 OFFICE O/P EST MOD 30 MIN: CPT | Performed by: FAMILY MEDICINE

## 2022-08-30 PROCEDURE — 81002 URINALYSIS NONAUTO W/O SCOPE: CPT | Performed by: FAMILY MEDICINE

## 2022-08-30 RX ORDER — NITROFURANTOIN 25; 75 MG/1; MG/1
100 CAPSULE ORAL 2 TIMES DAILY
Qty: 10 CAPSULE | Refills: 0 | Status: SHIPPED | OUTPATIENT
Start: 2022-08-30 | End: 2022-10-06

## 2022-08-30 RX ORDER — PAROXETINE HYDROCHLORIDE 20 MG/1
20 TABLET, FILM COATED ORAL DAILY
Qty: 90 TABLET | Refills: 3 | Status: SHIPPED | OUTPATIENT
Start: 2022-08-30

## 2022-08-30 RX ORDER — ATENOLOL 50 MG/1
TABLET ORAL
Qty: 180 TABLET | Refills: 2 | Status: SHIPPED | OUTPATIENT
Start: 2022-08-30

## 2022-08-30 RX ORDER — ATORVASTATIN CALCIUM 10 MG/1
TABLET, FILM COATED ORAL
Qty: 90 TABLET | Refills: 3 | Status: SHIPPED | OUTPATIENT
Start: 2022-08-30

## 2022-08-30 NOTE — PROGRESS NOTES
Doug Ray is a 69 y.o. female who presents to the office today with the following:  No chief complaint on file. HPI  Dysuria started this am  With burning, urgency and frequency  No back pain    Hyperlipidemia  Fasting for BW  No SE with meds    On Paxil  Doing well on one a day    Hx of SVT  No Afib  On Atenolol    On Ca with Vit D    Review of Systems   Constitutional:  Negative for fever. HENT:  Negative for congestion. Respiratory:  Negative for cough. Cardiovascular:  Positive for palpitations (last 2 weeks more frequent). Negative for chest pain. Gastrointestinal:  Negative for abdominal pain, nausea and vomiting. Genitourinary:  Positive for dysuria, frequency and urgency. Negative for flank pain and hematuria. Musculoskeletal:  Negative for back pain. Neurological:  Positive for dizziness. See HPI. Past Medical History:   Diagnosis Date    Anxiety     Cataract     GERD (gastroesophageal reflux disease)     Glaucoma     open angle    HCVD (hypertensive cardiovascular disease)     Herpes labialis     Ill-defined condition     C3-6 cervical stenosis    Menopause     Mixed hyperlipidemia     Osteoporosis 2015    RBBB (right bundle branch block)     Renal stones     SCC (squamous cell carcinoma) 2022    on leg    SVT (supraventricular tachycardia) (HCC)        Past Surgical History:   Procedure Laterality Date    HX BLADDER REPAIR      HX BREAST BIOPSY Left     BENIGN    HX  SECTION      HX COLONOSCOPY      q5-7 y    HX CYST INCISION AND DRAINAGE      left breast, benign    HX HYSTERECTOMY      HX OOPHORECTOMY         Allergies   Allergen Reactions    Ciprofloxacin Anaphylaxis    Morphine Other (comments)     Delusions  Other reaction(s):  Other (comments)  Delusions    Timolol Palpitations     Nausea    Povidone-Iodine Other (comments) and Rash     Burns her skin  Burns her skin       Current Outpatient Medications   Medication Sig    PARoxetine (PAXIL) 20 mg tablet Take 1 Tablet by mouth daily. atorvastatin (LIPITOR) 10 mg tablet TAKE 1 TABLET BY MOUTH ONE TIME A DAY    atenoloL (TENORMIN) 50 mg tablet TAKE 1 TABLET BY MOUTH 2 TIMES A DAY    nitrofurantoin, macrocrystal-monohydrate, (Macrobid) 100 mg capsule Take 1 Capsule by mouth two (2) times a day. vit C,W-Xs-askof-lutein-zeaxan (PreserVision AREDS-2) 250-90-40-1 mg cap capsule Take  by mouth. ZINC PO Take  by mouth. cyclobenzaprine (FLEXERIL) 5 mg tablet Take 1 Tablet by mouth three (3) times daily as needed for Muscle Spasm(s). famotidine (PEPCID) 20 mg tablet TAKE 1 TABLET BY MOUTH TWICE DAILY    valACYclovir (Valtrex) 1 gram tablet Take 2 tablets bid for 1 d    levobunoloL (BETAGAN) 0.5 % ophthalmic solution     ibuprofen (MOTRIN) 200 mg tablet Take  by mouth every six (6) hours as needed. latanoprost (XALATAN) 0.005 % ophthalmic solution     multivitamin (ONE A DAY) tablet Take 1 Tab by mouth every other day. Calcium Carbonate-Vit D3-Min 600 mg calcium- 400 unit tab Take  by mouth two (2) times a day. omega-3 fatty acids-vitamin e (FISH OIL) 1,000 mg cap Take 1 Cap by mouth daily. (Patient taking differently: Take 1,200 Capsules by mouth daily. Takes 2 daily)     No current facility-administered medications for this visit.        Social History     Socioeconomic History    Marital status:    Tobacco Use    Smoking status: Some Days     Packs/day: 0.50     Years: 35.00     Pack years: 17.50     Types: Cigarettes    Smokeless tobacco: Never   Vaping Use    Vaping Use: Never used   Substance and Sexual Activity    Alcohol use: Yes     Comment: Occasional    Drug use: No    Sexual activity: Yes     Partners: Male       Family History   Problem Relation Age of Onset    Diabetes Mother     Dementia Mother     Heart Disease Father         chf    Cancer Sister         lung ca         Physical Exam:  Visit Vitals  /80   Pulse 66   Temp 97.5 °F (36.4 °C) (Temporal)   Resp 16   SpO2 97% Physical Exam  Vitals reviewed. Constitutional:       Appearance: She is normal weight. HENT:      Head: Normocephalic and atraumatic. Right Ear: Tympanic membrane, ear canal and external ear normal.      Left Ear: Tympanic membrane, ear canal and external ear normal.   Eyes:      Extraocular Movements: Extraocular movements intact. Conjunctiva/sclera: Conjunctivae normal.   Cardiovascular:      Rate and Rhythm: Normal rate and regular rhythm. Heart sounds: Normal heart sounds. Pulmonary:      Effort: Pulmonary effort is normal.      Breath sounds: Normal breath sounds. Abdominal:      General: Abdomen is flat. There is no distension. Palpations: Abdomen is soft. Tenderness: There is abdominal tenderness (in low abdomen). There is no right CVA tenderness, left CVA tenderness or guarding. Musculoskeletal:      Right lower leg: No edema. Left lower leg: No edema. Lymphadenopathy:      Cervical: No cervical adenopathy. Skin:     General: Skin is warm and dry. Neurological:      Mental Status: She is alert and oriented to person, place, and time. Psychiatric:         Mood and Affect: Mood normal.         Behavior: Behavior normal.       Assessment/Plan:    ICD-10-CM ICD-9-CM    1. Mixed hyperlipidemia  E78.2 272.2 LIPID PANEL      METABOLIC PANEL, COMPREHENSIVE      atorvastatin (LIPITOR) 10 mg tablet      2. Vitamin D deficiency, unspecified  E55.9 268.9 VITAMIN D, 25 HYDROXY      3. Encounter for screening mammogram for malignant neoplasm of breast  Z12.31 V76.12 [facility] 3D JEANETTE W MAMMO BI SCREENING INCL CAD      4. Dysuria  R30.0 788.1 CBC WITH AUTOMATED DIFF      AMB POC URINALYSIS DIP STICK MANUAL W/O MICRO      CULTURE, URINE      nitrofurantoin, macrocrystal-monohydrate, (Macrobid) 100 mg capsule      5. Anxiety  F41.9 300.00 PARoxetine (PAXIL) 20 mg tablet      6.  SVT (supraventricular tachycardia) (HCC)  I47.1 427.89 atenoloL (TENORMIN) 50 mg tablet Abrahan Meda MD

## 2022-08-31 LAB
25(OH)D3 SERPL-MCNC: 45.9 NG/ML (ref 30–100)
ALBUMIN SERPL-MCNC: 3.9 G/DL (ref 3.5–5)
ALBUMIN/GLOB SERPL: 1.3 {RATIO} (ref 1.1–2.2)
ALP SERPL-CCNC: 116 U/L (ref 45–117)
ALT SERPL-CCNC: 25 U/L (ref 12–78)
ANION GAP SERPL CALC-SCNC: 6 MMOL/L (ref 5–15)
AST SERPL-CCNC: 20 U/L (ref 15–37)
BASOPHILS # BLD: 0.1 K/UL (ref 0–0.1)
BASOPHILS NFR BLD: 2 % (ref 0–1)
BILIRUB SERPL-MCNC: 0.2 MG/DL (ref 0.2–1)
BUN SERPL-MCNC: 18 MG/DL (ref 6–20)
BUN/CREAT SERPL: 23 (ref 12–20)
CALCIUM SERPL-MCNC: 10.1 MG/DL (ref 8.5–10.1)
CHLORIDE SERPL-SCNC: 107 MMOL/L (ref 97–108)
CHOLEST SERPL-MCNC: 149 MG/DL
CO2 SERPL-SCNC: 29 MMOL/L (ref 21–32)
CREAT SERPL-MCNC: 0.78 MG/DL (ref 0.55–1.02)
DIFFERENTIAL METHOD BLD: ABNORMAL
EOSINOPHIL # BLD: 0.4 K/UL (ref 0–0.4)
EOSINOPHIL NFR BLD: 6 % (ref 0–7)
ERYTHROCYTE [DISTWIDTH] IN BLOOD BY AUTOMATED COUNT: 12.6 % (ref 11.5–14.5)
GLOBULIN SER CALC-MCNC: 3 G/DL (ref 2–4)
GLUCOSE SERPL-MCNC: 105 MG/DL (ref 65–100)
HCT VFR BLD AUTO: 41.9 % (ref 35–47)
HDLC SERPL-MCNC: 51 MG/DL
HDLC SERPL: 2.9 {RATIO} (ref 0–5)
HGB BLD-MCNC: 13.5 G/DL (ref 11.5–16)
IMM GRANULOCYTES # BLD AUTO: 0 K/UL (ref 0–0.04)
IMM GRANULOCYTES NFR BLD AUTO: 0 % (ref 0–0.5)
LDLC SERPL CALC-MCNC: 68.2 MG/DL (ref 0–100)
LYMPHOCYTES # BLD: 1.7 K/UL (ref 0.8–3.5)
LYMPHOCYTES NFR BLD: 29 % (ref 12–49)
MCH RBC QN AUTO: 32.8 PG (ref 26–34)
MCHC RBC AUTO-ENTMCNC: 32.2 G/DL (ref 30–36.5)
MCV RBC AUTO: 101.7 FL (ref 80–99)
MONOCYTES # BLD: 0.6 K/UL (ref 0–1)
MONOCYTES NFR BLD: 10 % (ref 5–13)
NEUTS SEG # BLD: 3.2 K/UL (ref 1.8–8)
NEUTS SEG NFR BLD: 53 % (ref 32–75)
NRBC # BLD: 0 K/UL (ref 0–0.01)
NRBC BLD-RTO: 0 PER 100 WBC
PLATELET # BLD AUTO: 363 K/UL (ref 150–400)
PMV BLD AUTO: 9.5 FL (ref 8.9–12.9)
POTASSIUM SERPL-SCNC: 4.5 MMOL/L (ref 3.5–5.1)
PROT SERPL-MCNC: 6.9 G/DL (ref 6.4–8.2)
RBC # BLD AUTO: 4.12 M/UL (ref 3.8–5.2)
SODIUM SERPL-SCNC: 142 MMOL/L (ref 136–145)
TRIGL SERPL-MCNC: 149 MG/DL (ref ?–150)
VLDLC SERPL CALC-MCNC: 29.8 MG/DL
WBC # BLD AUTO: 6.1 K/UL (ref 3.6–11)

## 2022-09-01 NOTE — PROGRESS NOTES
Notify Marcie Xiao  The Vit D is good  The electrolytes, kidney and liver tests are normal  The Glucose is a touch up, we will monitor  The CBC is ok  The Chol is good

## 2022-09-02 LAB
BACTERIA SPEC CULT: ABNORMAL
CC UR VC: ABNORMAL
SERVICE CMNT-IMP: ABNORMAL

## 2022-10-06 ENCOUNTER — CLINICAL SUPPORT (OUTPATIENT)
Dept: FAMILY MEDICINE CLINIC | Age: 67
End: 2022-10-06
Payer: MEDICARE

## 2022-10-06 ENCOUNTER — TELEPHONE (OUTPATIENT)
Dept: FAMILY MEDICINE CLINIC | Age: 67
End: 2022-10-06

## 2022-10-06 DIAGNOSIS — R30.0 BURNING WITH URINATION: Primary | ICD-10-CM

## 2022-10-06 DIAGNOSIS — N30.90 CYSTITIS: Primary | ICD-10-CM

## 2022-10-06 LAB
BILIRUB UR QL STRIP: NEGATIVE
GLUCOSE UR-MCNC: NEGATIVE MG/DL
KETONES P FAST UR STRIP-MCNC: NEGATIVE MG/DL
PH UR STRIP: 6 [PH] (ref 4.6–8)
PROT UR QL STRIP: NEGATIVE
SP GR UR STRIP: 1.02 (ref 1–1.03)
UA UROBILINOGEN AMB POC: NORMAL (ref 0.2–1)
URINALYSIS CLARITY POC: CLEAR
URINALYSIS COLOR POC: YELLOW
URINE BLOOD POC: NORMAL
URINE LEUKOCYTES POC: NORMAL
URINE NITRITES POC: NEGATIVE

## 2022-10-06 PROCEDURE — 81002 URINALYSIS NONAUTO W/O SCOPE: CPT | Performed by: FAMILY MEDICINE

## 2022-10-06 RX ORDER — CEPHALEXIN 500 MG/1
500 CAPSULE ORAL 3 TIMES DAILY
Qty: 15 CAPSULE | Refills: 0 | Status: SHIPPED | OUTPATIENT
Start: 2022-10-06 | End: 2022-10-11

## 2022-10-06 NOTE — TELEPHONE ENCOUNTER
Pt called with urinary frequency and urgency and burning  Started today  UA shows Leukocytes  And a Cx was ordered    I have called in Keflex

## 2022-10-09 LAB
BACTERIA SPEC CULT: ABNORMAL
CC UR VC: ABNORMAL
SERVICE CMNT-IMP: ABNORMAL

## 2022-10-10 NOTE — PROGRESS NOTES
Spoke with patient after verifying Name, and , informed patient of Urine Culture results and recommendations per Dr. Billy Dalton . Patient given an opportunity to ask questions, repeated information, and verbalized understanding.

## 2022-10-12 ENCOUNTER — OFFICE VISIT (OUTPATIENT)
Dept: FAMILY MEDICINE CLINIC | Age: 67
End: 2022-10-12
Payer: MEDICARE

## 2022-10-12 VITALS
HEART RATE: 67 BPM | HEIGHT: 70 IN | DIASTOLIC BLOOD PRESSURE: 82 MMHG | WEIGHT: 151 LBS | BODY MASS INDEX: 21.62 KG/M2 | TEMPERATURE: 97.5 F | SYSTOLIC BLOOD PRESSURE: 122 MMHG | OXYGEN SATURATION: 97 % | RESPIRATION RATE: 16 BRPM

## 2022-10-12 DIAGNOSIS — L84 CORN OF TOE: Primary | ICD-10-CM

## 2022-10-12 PROCEDURE — 99213 OFFICE O/P EST LOW 20 MIN: CPT | Performed by: FAMILY MEDICINE

## 2022-10-12 NOTE — PROGRESS NOTES
1. \"Have you been to the ER, urgent care clinic since your last visit? Hospitalized since your last visit? \" No    2. \"Have you seen or consulted any other health care providers outside of the [address] [place] [address] since your last visit? \" No     3. For patients aged 39-70: Has the patient had a colonoscopy / FIT/ Cologuard? Yes - no Care Gap present      If the patient is female:    4. For patients aged 41-77: Has the patient had a mammogram within the past 2 years? Yes - no Care Gap present      5. For patients aged 21-65: Has the patient had a pap smear? No    10/12/2022      Chief Complaint   Patient presents with    Foot Problem     Right          History of Present Illness:         is a 69 y.o. female with large painful corn on R 5th toe. No breakdown. Allergies   Allergen Reactions    Ciprofloxacin Anaphylaxis    Morphine Other (comments)     Delusions  Other reaction(s): Other (comments)  Delusions    Timolol Palpitations     Nausea    Povidone-Iodine Other (comments) and Rash     Burns her skin  Burns her skin       Current Outpatient Medications   Medication Sig    PARoxetine (PAXIL) 20 mg tablet Take 1 Tablet by mouth daily. atorvastatin (LIPITOR) 10 mg tablet TAKE 1 TABLET BY MOUTH ONE TIME A DAY    atenoloL (TENORMIN) 50 mg tablet TAKE 1 TABLET BY MOUTH 2 TIMES A DAY    vit C,K-Nv-liils-lutein-zeaxan (PreserVision AREDS-2) 250-90-40-1 mg cap capsule Take  by mouth. ZINC PO Take  by mouth. cyclobenzaprine (FLEXERIL) 5 mg tablet Take 1 Tablet by mouth three (3) times daily as needed for Muscle Spasm(s). famotidine (PEPCID) 20 mg tablet TAKE 1 TABLET BY MOUTH TWICE DAILY    valACYclovir (Valtrex) 1 gram tablet Take 2 tablets bid for 1 d    levobunoloL (BETAGAN) 0.5 % ophthalmic solution     ibuprofen (MOTRIN) 200 mg tablet Take  by mouth every six (6) hours as needed.     latanoprost (XALATAN) 0.005 % ophthalmic solution     multivitamin (ONE A DAY) tablet Take 1 Tab by mouth every other day. Calcium Carbonate-Vit D3-Min 600 mg calcium- 400 unit tab Take  by mouth two (2) times a day. omega-3 fatty acids-vitamin e (FISH OIL) 1,000 mg cap Take 1 Cap by mouth daily. (Patient taking differently: Take 1,200 Capsules by mouth daily. Takes 2 daily)     No current facility-administered medications for this visit. Physical Examination:    Visit Vitals  /82 (BP 1 Location: Left upper arm, BP Patient Position: Sitting, BP Cuff Size: Adult)   Pulse 67   Temp 97.5 °F (36.4 °C) (Oral)   Resp 16   Ht 5' 10\" (1.778 m)   Wt 151 lb (68.5 kg)   SpO2 97%   BMI 21.67 kg/m²      General:  Alert, cooperative, no distress. Skin: Skin color, texture, turgor normal. 2 cm corn on lateral mid 5th R toe   Lymph nodes: Cervical, supraclavicular, and axillary nodes normal.   Neurologic: CNII-XII intact. Normal strength, sensation, and reflexes throughout. ASSESSMENT AND PLAN    1. Corn of toe  Lesion shaved to soft tissue. Continue pressure unloading pads. No orders of the defined types were placed in this encounter.           Chin Becerril MD

## 2022-10-13 ENCOUNTER — CLINICAL SUPPORT (OUTPATIENT)
Dept: FAMILY MEDICINE CLINIC | Age: 67
End: 2022-10-13
Payer: MEDICARE

## 2022-10-13 DIAGNOSIS — Z23 NEEDS FLU SHOT: Primary | ICD-10-CM

## 2022-10-13 PROCEDURE — 90694 VACC AIIV4 NO PRSRV 0.5ML IM: CPT | Performed by: FAMILY MEDICINE

## 2022-10-13 PROCEDURE — G0008 ADMIN INFLUENZA VIRUS VAC: HCPCS | Performed by: FAMILY MEDICINE

## 2022-10-13 NOTE — PATIENT INSTRUCTIONS
Vaccine Information Statement    Influenza (Flu) Vaccine (Inactivated or Recombinant): What You Need to Know    Many vaccine information statements are available in [place] and other languages. See www.immunize.org/vis. Hojas de información sobre vacunas están disponibles en español y en muchos otros idiomas. Visite www.immunize.org/vis. 1. Why get vaccinated? Influenza vaccine can prevent influenza (flu). Flu is a contagious disease that spreads around the [facility] every year, usually between October and May. Anyone can get the flu, but it is more dangerous for some people. Infants and young children, people 72 years and older, pregnant people, and people with certain health conditions or a weakened immune system are at greatest risk of flu complications. Pneumonia, bronchitis, sinus infections, and ear infections are examples of flu-related complications. If you have a medical condition, such as heart disease, cancer, or diabetes, flu can make it worse. Flu can cause fever and chills, sore throat, muscle aches, fatigue, cough, headache, and runny or stuffy nose. Some people may have vomiting and diarrhea, though this is more common in children than adults. In an average year, thousands of people in the [facility] die from flu, and many more are hospitalized. Flu vaccine prevents millions of illnesses and flu-related visits to the doctor each year. 2. Influenza vaccines     CDC recommends everyone 6 months and older get vaccinated every flu season. Children 6 months through 6years of age may need 2 doses during a single flu season. Everyone else needs only 1 dose each flu season. It takes about 2 weeks for protection to develop after vaccination. There are many flu viruses, and they are always changing. Each year a new flu vaccine is made to protect against the influenza viruses believed to be likely to cause disease in the upcoming flu season.  Even when the vaccine doesnt exactly match these viruses, it may still provide some protection. Influenza vaccine does not cause flu. Influenza vaccine may be given at the same time as other vaccines. 3. Talk with your health care provider    Tell your vaccination provider if the person getting the vaccine:  Has had an allergic reaction after a previous dose of influenza vaccine, or has any severe, life-threatening allergies   Has ever had Guillain-Barré Syndrome (also called GBS)    In some cases, your health care provider may decide to postpone influenza vaccination until a future visit. Influenza vaccine can be administered at any time during pregnancy. People who are or will be pregnant during influenza season should receive inactivated influenza vaccine. People with minor illnesses, such as a cold, may be vaccinated. People who are moderately or severely ill should usually wait until they recover before getting influenza vaccine. Your health care provider can give you more information. 4. Risks of a vaccine reaction    Soreness, redness, and swelling where the shot is given, fever, muscle aches, and headache can happen after influenza vaccination. There may be a very small increased risk of Guillain-Barré Syndrome (GBS) after inactivated influenza vaccine (the flu shot). Adia Fraser children who get the flu shot along with pneumococcal vaccine (PCV13) and/or DTaP vaccine at the same time might be slightly more likely to have a seizure caused by fever. Tell your health care provider if a child who is getting flu vaccine has ever had a seizure. People sometimes faint after medical procedures, including vaccination. Tell your provider if you feel dizzy or have vision changes or ringing in the ears. As with any medicine, there is a very remote chance of a vaccine causing a severe allergic reaction, other serious injury, or death. 5. What if there is a serious problem?     An allergic reaction could occur after the vaccinated person leaves the clinic. If you see signs of a severe allergic reaction (hives, swelling of the face and throat, difficulty breathing, a fast heartbeat, dizziness, or weakness), call 9-1-1 and get the person to the nearest hospital.    For other signs that concern you, call your health care provider. Adverse reactions should be reported to the Vaccine Adverse Event Reporting System (VAERS). Your health care provider will usually file this report, or you can do it yourself. Visit the VAERS website at www.vaers. [facility].gov or call 3-624.277.1611. VAERS is only for reporting reactions, and VAERS staff members do not give medical advice. 6. The National Vaccine Injury Compensation Program    The [facility] Vaccine Injury Compensation Program (VICP) is a federal program that was created to compensate people who may have been injured by certain vaccines. Claims regarding alleged injury or death due to vaccination have a time limit for filing, which may be as short as two years. Visit the VICP website at www.[facility].gov/vaccinecompensation or call 0-800.117.5190 to learn about the program and about filing a claim. 7. How can I learn more? Ask your health care provider. Call your local or state health department. Visit the website of the Food and Drug Administration (FDA) for vaccine package inserts and additional information at www.fda.gov/vaccines-blood-biologics/vaccines. Contact the Centers for Disease Control and Prevention (CDC): Call 9-328.264.3667 (5-644-VED-INFO) or  Visit CDCs influenza website at www.cdc.gov/flu. Vaccine Information Statement   Inactivated Influenza Vaccine   8/6/2021  42 KRAIG Leo [id number]   Department of Health and Human Services  Centers for Disease Control and Prevention    Office Use Only

## 2022-10-13 NOTE — LETTER
10/13/2022 2:28 PM    Ms. Henri Harper  [address] [address]      RE:  Henri Harper   [birth year]      Dear Dr Nghia Shanks is a patient of [facility] with Bethany Lozada MD. Please fax this patient's most recent flu vaccine so that we may update the patient's records for continuity of care. Our office number is [phone] if you should have any additional questions.      Our fax number: [phone]                    Sincerely,      [place]

## 2022-10-13 NOTE — PROGRESS NOTES
Patient was administered Flu shot in Left deltoid via IM. Patient tolerated Flu shot well. Medication information reviewed with patient, patient states understanding. Patient to resume routine medications at home. Patient given copy of AVS and VIIS with medication information and instructions for home. VIIS reviewed with patient and patient states understanding.

## 2022-10-19 DIAGNOSIS — K21.9 GASTROESOPHAGEAL REFLUX DISEASE WITHOUT ESOPHAGITIS: ICD-10-CM

## 2022-10-19 RX ORDER — FAMOTIDINE 20 MG/1
TABLET, FILM COATED ORAL
Qty: 180 TABLET | Refills: 0 | Status: SHIPPED | OUTPATIENT
Start: 2022-10-19

## 2022-10-26 ENCOUNTER — OFFICE VISIT (OUTPATIENT)
Dept: FAMILY MEDICINE CLINIC | Age: 67
End: 2022-10-26
Payer: MEDICARE

## 2022-10-26 VITALS
HEIGHT: 70 IN | TEMPERATURE: 97 F | DIASTOLIC BLOOD PRESSURE: 84 MMHG | RESPIRATION RATE: 16 BRPM | SYSTOLIC BLOOD PRESSURE: 124 MMHG | HEART RATE: 67 BPM | WEIGHT: 152 LBS | BODY MASS INDEX: 21.76 KG/M2 | OXYGEN SATURATION: 96 %

## 2022-10-26 DIAGNOSIS — L57.0 ACTINIC KERATOSES: Primary | ICD-10-CM

## 2022-10-26 PROCEDURE — 3017F COLORECTAL CA SCREEN DOC REV: CPT | Performed by: FAMILY MEDICINE

## 2022-10-26 PROCEDURE — G8536 NO DOC ELDER MAL SCRN: HCPCS | Performed by: FAMILY MEDICINE

## 2022-10-26 PROCEDURE — G8427 DOCREV CUR MEDS BY ELIG CLIN: HCPCS | Performed by: FAMILY MEDICINE

## 2022-10-26 PROCEDURE — G9899 SCRN MAM PERF RSLTS DOC: HCPCS | Performed by: FAMILY MEDICINE

## 2022-10-26 PROCEDURE — 1090F PRES/ABSN URINE INCON ASSESS: CPT | Performed by: FAMILY MEDICINE

## 2022-10-26 PROCEDURE — G8420 CALC BMI NORM PARAMETERS: HCPCS | Performed by: FAMILY MEDICINE

## 2022-10-26 PROCEDURE — 1101F PT FALLS ASSESS-DOCD LE1/YR: CPT | Performed by: FAMILY MEDICINE

## 2022-10-26 PROCEDURE — 17000 DESTRUCT PREMALG LESION: CPT | Performed by: FAMILY MEDICINE

## 2022-10-26 PROCEDURE — G8399 PT W/DXA RESULTS DOCUMENT: HCPCS | Performed by: FAMILY MEDICINE

## 2022-10-26 PROCEDURE — 17003 DESTRUCT PREMALG LES 2-14: CPT | Performed by: FAMILY MEDICINE

## 2022-10-26 PROCEDURE — G8510 SCR DEP NEG, NO PLAN REQD: HCPCS | Performed by: FAMILY MEDICINE

## 2022-10-26 PROCEDURE — 1123F ACP DISCUSS/DSCN MKR DOCD: CPT | Performed by: FAMILY MEDICINE

## 2022-10-26 NOTE — PROGRESS NOTES
1. \"Have you been to the ER, urgent care clinic since your last visit? Hospitalized since your last visit? \" No    2. \"Have you seen or consulted any other health care providers outside of the [address] [place] [address] since your last visit? \" No     3. For patients aged 39-70: Has the patient had a colonoscopy / FIT/ Cologuard? Yes - no Care Gap present      If the patient is female:    4. For patients aged 41-77: Has the patient had a mammogram within the past 2 years? Yes - no Care Gap present      5. For patients aged 21-65: Has the patient had a pap smear? No    10/26/2022      Chief Complaint   Patient presents with    Skin Problem         History of Present Illness:         is a 69 y.o. female with multiple concerning skin lesions      Allergies   Allergen Reactions    Ciprofloxacin Anaphylaxis    Morphine Other (comments)     Delusions  Other reaction(s): Other (comments)  Delusions    Timolol Palpitations     Nausea    Povidone-Iodine Other (comments) and Rash     Burns her skin  Burns her skin       Current Outpatient Medications   Medication Sig    famotidine (PEPCID) 20 mg tablet TAKE 1 TABLET BY MOUTH 2 TIMES A DAY    PARoxetine (PAXIL) 20 mg tablet Take 1 Tablet by mouth daily. atorvastatin (LIPITOR) 10 mg tablet TAKE 1 TABLET BY MOUTH ONE TIME A DAY    atenoloL (TENORMIN) 50 mg tablet TAKE 1 TABLET BY MOUTH 2 TIMES A DAY    vit C,N-Wk-hcnmc-lutein-zeaxan (PreserVision AREDS-2) 250-90-40-1 mg cap capsule Take  by mouth. ZINC PO Take  by mouth. cyclobenzaprine (FLEXERIL) 5 mg tablet Take 1 Tablet by mouth three (3) times daily as needed for Muscle Spasm(s). valACYclovir (Valtrex) 1 gram tablet Take 2 tablets bid for 1 d    levobunoloL (BETAGAN) 0.5 % ophthalmic solution     ibuprofen (MOTRIN) 200 mg tablet Take  by mouth every six (6) hours as needed. latanoprost (XALATAN) 0.005 % ophthalmic solution     multivitamin (ONE A DAY) tablet Take 1 Tab by mouth every other day. Calcium Carbonate-Vit D3-Min 600 mg calcium- 400 unit tab Take  by mouth two (2) times a day. omega-3 fatty acids-vitamin e (FISH OIL) 1,000 mg cap Take 1 Cap by mouth daily. (Patient taking differently: Take 1,200 Capsules by mouth daily. Takes 2 daily)     No current facility-administered medications for this visit. Physical Examination:    Visit Vitals  /84 (BP 1 Location: Left upper arm, BP Patient Position: Sitting, BP Cuff Size: Adult)   Pulse 67   Temp 97 °F (36.1 °C) (Oral)   Resp 16   Ht 5' 10\" (1.778 m)   Wt 152 lb (68.9 kg)   SpO2 96%   BMI 21.81 kg/m²      General:  Alert, cooperative, no distress. Skin: Skin color, texture, turgor normal. 3 mm AK on L cheek lateral to mouth. Multiple small AKs on anterior trunk. Lymph nodes: Cervical, supraclavicular, and axillary nodes normal.   Neurologic: CNII-XII intact. Normal strength, sensation, and reflexes throughout. ASSESSMENT AND PLAN    1. Actinic keratoses  10 lesions frozen x 30 sec  - DESTRUC PREMALIGNANT,2-14 LESIONS              No orders of the defined types were placed in this encounter.           Tova Newman MD

## 2022-11-03 DIAGNOSIS — Z12.31 ENCOUNTER FOR SCREENING MAMMOGRAM FOR MALIGNANT NEOPLASM OF BREAST: ICD-10-CM

## 2022-12-02 RX ORDER — VALACYCLOVIR HYDROCHLORIDE 1 G/1
TABLET, FILM COATED ORAL
Qty: 6 TABLET | Refills: 3 | Status: SHIPPED | OUTPATIENT
Start: 2022-12-02

## 2022-12-12 ENCOUNTER — OFFICE VISIT (OUTPATIENT)
Dept: FAMILY MEDICINE CLINIC | Age: 67
End: 2022-12-12
Payer: MEDICARE

## 2022-12-12 VITALS
OXYGEN SATURATION: 100 % | DIASTOLIC BLOOD PRESSURE: 71 MMHG | HEIGHT: 70 IN | RESPIRATION RATE: 16 BRPM | TEMPERATURE: 96.4 F | BODY MASS INDEX: 21.47 KG/M2 | WEIGHT: 150 LBS | SYSTOLIC BLOOD PRESSURE: 111 MMHG | HEART RATE: 58 BPM

## 2022-12-12 DIAGNOSIS — R21 RASH OF FACE: Primary | ICD-10-CM

## 2022-12-12 PROCEDURE — G8420 CALC BMI NORM PARAMETERS: HCPCS | Performed by: PHYSICIAN ASSISTANT

## 2022-12-12 PROCEDURE — G8399 PT W/DXA RESULTS DOCUMENT: HCPCS | Performed by: PHYSICIAN ASSISTANT

## 2022-12-12 PROCEDURE — 1101F PT FALLS ASSESS-DOCD LE1/YR: CPT | Performed by: PHYSICIAN ASSISTANT

## 2022-12-12 PROCEDURE — G8432 DEP SCR NOT DOC, RNG: HCPCS | Performed by: PHYSICIAN ASSISTANT

## 2022-12-12 PROCEDURE — 1123F ACP DISCUSS/DSCN MKR DOCD: CPT | Performed by: PHYSICIAN ASSISTANT

## 2022-12-12 PROCEDURE — 1090F PRES/ABSN URINE INCON ASSESS: CPT | Performed by: PHYSICIAN ASSISTANT

## 2022-12-12 PROCEDURE — G8536 NO DOC ELDER MAL SCRN: HCPCS | Performed by: PHYSICIAN ASSISTANT

## 2022-12-12 PROCEDURE — G8427 DOCREV CUR MEDS BY ELIG CLIN: HCPCS | Performed by: PHYSICIAN ASSISTANT

## 2022-12-12 PROCEDURE — G9899 SCRN MAM PERF RSLTS DOC: HCPCS | Performed by: PHYSICIAN ASSISTANT

## 2022-12-12 PROCEDURE — 99213 OFFICE O/P EST LOW 20 MIN: CPT | Performed by: PHYSICIAN ASSISTANT

## 2022-12-12 PROCEDURE — 3017F COLORECTAL CA SCREEN DOC REV: CPT | Performed by: PHYSICIAN ASSISTANT

## 2022-12-12 RX ORDER — METRONIDAZOLE 7.5 MG/G
GEL TOPICAL DAILY
Qty: 60 G | Refills: 0 | Status: SHIPPED | OUTPATIENT
Start: 2022-12-12

## 2022-12-12 NOTE — PROGRESS NOTES
1. \"Have you been to the ER, urgent care clinic since your last visit? Hospitalized since your last visit? \" No    2. \"Have you seen or consulted any other health care providers outside of the [address] [place] [address] since your last visit? \" No     3. For patients aged 39-70: Has the patient had a colonoscopy / FIT/ Cologuard? Yes - no Care Gap present      If the patient is female:    4. For patients aged 41-77: Has the patient had a mammogram within the past 2 years? Yes - no Care Gap present      5. For patients aged 21-65: Has the patient had a pap smear?  NA - based on age or sex

## 2022-12-12 NOTE — PROGRESS NOTES
Beni Faith is a 69 y.o. female who presents to the office today with the following:  Chief Complaint   Patient presents with    Rash     On face, burning and itchy and scaley       HPI  For last several weeks pt has noticed her lips and skin from just below eyes to jaw line, around mouth/cheeks, feels dry, warm with \"Burning sensation like wind burn\" and gets itchy/scaly and feels bumpy but cannot see bumps. Does see small spots of blood vessels. Has no new products for a year. Does use retinol daily, but has cut back due to burning. Has not had overall redness/warmth to touch, swelling, or visible rash otherwise. Cannot identify any provoking or palliative factors. Feels better with cool compresses. Did recently recover from covid a couple weeks ago, but this started before that. Gets dull HA/scratchy throat/cold sores this time of year commonly. Has not have fevers since covid. ROS  See HPI. Past Medical History:   Diagnosis Date    Anxiety     Cataract     GERD (gastroesophageal reflux disease)     Glaucoma     open angle    HCVD (hypertensive cardiovascular disease)     Herpes labialis     Ill-defined condition     C3-6 cervical stenosis    Menopause     Mixed hyperlipidemia     Osteoporosis 2015    RBBB (right bundle branch block)     Renal stones     SCC (squamous cell carcinoma) 2022    on leg    SVT (supraventricular tachycardia) (HCC)        Past Surgical History:   Procedure Laterality Date    HX BLADDER REPAIR      HX BREAST BIOPSY Left     BENIGN    HX  SECTION      HX COLONOSCOPY      q5-7 y    HX CYST INCISION AND DRAINAGE      left breast, benign    HX HYSTERECTOMY      HX OOPHORECTOMY         Allergies   Allergen Reactions    Ciprofloxacin Anaphylaxis    Morphine Other (comments)     Delusions  Other reaction(s):  Other (comments)  Delusions    Timolol Palpitations     Nausea    Povidone-Iodine Other (comments) and Rash     Burns her skin  Burns her skin Current Outpatient Medications   Medication Sig    metroNIDAZOLE (METROGEL) 0.75 % topical gel Apply  to affected area daily. valACYclovir (Valtrex) 1 gram tablet Take 2 tablets bid for 1 d    famotidine (PEPCID) 20 mg tablet TAKE 1 TABLET BY MOUTH 2 TIMES A DAY    PARoxetine (PAXIL) 20 mg tablet Take 1 Tablet by mouth daily. atorvastatin (LIPITOR) 10 mg tablet TAKE 1 TABLET BY MOUTH ONE TIME A DAY    atenoloL (TENORMIN) 50 mg tablet TAKE 1 TABLET BY MOUTH 2 TIMES A DAY    vit C,G-Xa-pndvr-lutein-zeaxan (PreserVision AREDS-2) 250-90-40-1 mg cap capsule Take  by mouth. ZINC PO Take  by mouth. cyclobenzaprine (FLEXERIL) 5 mg tablet Take 1 Tablet by mouth three (3) times daily as needed for Muscle Spasm(s). levobunoloL (BETAGAN) 0.5 % ophthalmic solution     ibuprofen (MOTRIN) 200 mg tablet Take  by mouth every six (6) hours as needed. latanoprost (XALATAN) 0.005 % ophthalmic solution     multivitamin (ONE A DAY) tablet Take 1 Tab by mouth every other day. Calcium Carbonate-Vit D3-Min 600 mg calcium- 400 unit tab Take  by mouth two (2) times a day. omega-3 fatty acids-vitamin e (FISH OIL) 1,000 mg cap Take 1 Cap by mouth daily. (Patient taking differently: Take 1,200 Capsules by mouth daily. Takes 2 daily)     No current facility-administered medications for this visit.        Social History     Socioeconomic History    Marital status:    Tobacco Use    Smoking status: Some Days     Packs/day: 0.50     Years: 35.00     Pack years: 17.50     Types: Cigarettes    Smokeless tobacco: Never   Vaping Use    Vaping Use: Never used   Substance and Sexual Activity    Alcohol use: Yes     Comment: Occasional    Drug use: No    Sexual activity: Yes     Partners: Male       Family History   Problem Relation Age of Onset    Diabetes Mother     Dementia Mother     Heart Disease Father         chf    Cancer Sister         lung ca         Physical Exam:  Visit Vitals  /71 (BP 1 Location: Right arm, BP Patient Position: Sitting, BP Cuff Size: Adult)   Pulse (!) 58   Temp (!) 96.4 °F (35.8 °C) (Oral)   Resp 16   Ht 5' 10\" (1.778 m)   Wt 150 lb (68 kg)   SpO2 100%   BMI 21.52 kg/m²     Physical Exam  Vitals and nursing note reviewed. Constitutional:       Appearance: Normal appearance. HENT:      Head: Normocephalic and atraumatic. Right Ear: Tympanic membrane, ear canal and external ear normal.      Left Ear: Tympanic membrane, ear canal and external ear normal.      Nose: Nose normal.      Right Sinus: No maxillary sinus tenderness or frontal sinus tenderness. Left Sinus: No maxillary sinus tenderness or frontal sinus tenderness. Mouth/Throat:      Mouth: Mucous membranes are moist.      Pharynx: Oropharynx is clear. Eyes:      Conjunctiva/sclera: Conjunctivae normal.   Cardiovascular:      Rate and Rhythm: Normal rate and regular rhythm. Pulses: Normal pulses. Heart sounds: Normal heart sounds. Pulmonary:      Effort: Pulmonary effort is normal.      Breath sounds: Normal breath sounds. Musculoskeletal:      Cervical back: Neck supple. Skin:     General: Skin is warm and dry. Comments: Maculopapular lesions, minimally visible (also has on foundation) but appears slightly risen but flat, rough with fine white scale, lower half of face (under eyes and down to jaw) small telangectasia's on cheeks/nose. Neurological:      Mental Status: She is alert and oriented to person, place, and time. Psychiatric:         Mood and Affect: Mood normal.       Assessment/Plan:    ICD-10-CM ICD-9-CM    1. Rash of face  R21 782.1 metroNIDAZOLE (METROGEL) 0.75 % topical gel        Discussed potential etiologies. Difficult exam with make-up, suspect possibly atopic derm vs rosacea (bhargavi with small telangectasia's pt notes are new), but distrubution seems unusual for that with lip/lower face symptoms. d/c other products for now, including all retinol creams.   Counseled on skin moisturizers, recommend ceruve twice daily. May spot tx with Aquaphor and apply to/around lips as needed between Rx gel doses. Avoid harsh elements/water temps. If no improvement in the next few weeks to notify me and will have her see Derm. Seek care in interim for any new sxs or other concerns. Pt verbalizes understanding and agrees with the plan.     Marcello Patricio PA-C

## 2022-12-28 DIAGNOSIS — I47.1 SVT (SUPRAVENTRICULAR TACHYCARDIA) (HCC): ICD-10-CM

## 2022-12-28 RX ORDER — ATENOLOL 50 MG/1
TABLET ORAL
Qty: 180 TABLET | Refills: 1 | Status: SHIPPED | OUTPATIENT
Start: 2022-12-28

## 2022-12-30 ENCOUNTER — TELEPHONE (OUTPATIENT)
Dept: FAMILY MEDICINE CLINIC | Age: 67
End: 2022-12-30

## 2022-12-30 DIAGNOSIS — J01.00 ACUTE NON-RECURRENT MAXILLARY SINUSITIS: Primary | ICD-10-CM

## 2022-12-30 RX ORDER — FLUTICASONE PROPIONATE 50 MCG
2 SPRAY, SUSPENSION (ML) NASAL DAILY
Qty: 1 EACH | Refills: 1 | Status: SHIPPED | OUTPATIENT
Start: 2022-12-30

## 2022-12-30 RX ORDER — AMOXICILLIN AND CLAVULANATE POTASSIUM 875; 125 MG/1; MG/1
1 TABLET, FILM COATED ORAL 2 TIMES DAILY
Qty: 20 TABLET | Refills: 0 | Status: SHIPPED | OUTPATIENT
Start: 2022-12-30 | End: 2023-01-09

## 2022-12-30 NOTE — TELEPHONE ENCOUNTER
I have called and spoke to this patient. I have advised her that RX's have been sent to the pharmacy for her by Dr Pura Tracy.

## 2022-12-30 NOTE — TELEPHONE ENCOUNTER
Patient reports having sinus pain, face swollen at sinus mask, ears ache, and jaw pain today. She can not take Cipro. She usually takes Amoxicillin, Erythromycin, or Keflex for various things. She also asks that you give RX for Flonase, as it is cheaper by RX. Thank you.

## 2023-02-22 DIAGNOSIS — K21.9 GASTROESOPHAGEAL REFLUX DISEASE WITHOUT ESOPHAGITIS: ICD-10-CM

## 2023-02-22 RX ORDER — FAMOTIDINE 20 MG/1
TABLET, FILM COATED ORAL
Qty: 180 TABLET | Refills: 0 | Status: SHIPPED | OUTPATIENT
Start: 2023-02-22

## 2023-05-15 RX ORDER — FAMOTIDINE 20 MG/1
TABLET, FILM COATED ORAL
Qty: 180 TABLET | Refills: 0 | Status: SHIPPED | OUTPATIENT
Start: 2023-05-15

## 2023-06-27 ENCOUNTER — NURSE ONLY (OUTPATIENT)
Dept: FAMILY MEDICINE CLINIC | Age: 68
End: 2023-06-27
Payer: MEDICARE

## 2023-06-27 DIAGNOSIS — M79.671 FOOT PAIN, RIGHT: ICD-10-CM

## 2023-06-27 DIAGNOSIS — M79.671 FOOT PAIN, RIGHT: Primary | ICD-10-CM

## 2023-06-27 PROCEDURE — 36415 COLL VENOUS BLD VENIPUNCTURE: CPT | Performed by: FAMILY MEDICINE

## 2023-06-28 LAB — URATE SERPL-MCNC: 6.3 MG/DL (ref 2.6–6)

## 2023-08-01 ENCOUNTER — OFFICE VISIT (OUTPATIENT)
Dept: FAMILY MEDICINE CLINIC | Age: 68
End: 2023-08-01
Payer: MEDICARE

## 2023-08-01 VITALS
WEIGHT: 150 LBS | HEIGHT: 70 IN | BODY MASS INDEX: 21.47 KG/M2 | HEART RATE: 64 BPM | DIASTOLIC BLOOD PRESSURE: 87 MMHG | TEMPERATURE: 97 F | RESPIRATION RATE: 16 BRPM | OXYGEN SATURATION: 96 % | SYSTOLIC BLOOD PRESSURE: 143 MMHG

## 2023-08-01 DIAGNOSIS — E78.5 HYPERLIPIDEMIA, UNSPECIFIED HYPERLIPIDEMIA TYPE: ICD-10-CM

## 2023-08-01 DIAGNOSIS — G89.29 CHRONIC BILATERAL LOW BACK PAIN WITHOUT SCIATICA: Primary | ICD-10-CM

## 2023-08-01 DIAGNOSIS — M25.552 LEFT HIP PAIN: ICD-10-CM

## 2023-08-01 DIAGNOSIS — M54.50 CHRONIC BILATERAL LOW BACK PAIN WITHOUT SCIATICA: Primary | ICD-10-CM

## 2023-08-01 PROCEDURE — G8399 PT W/DXA RESULTS DOCUMENT: HCPCS | Performed by: FAMILY MEDICINE

## 2023-08-01 PROCEDURE — 3017F COLORECTAL CA SCREEN DOC REV: CPT | Performed by: FAMILY MEDICINE

## 2023-08-01 PROCEDURE — G8428 CUR MEDS NOT DOCUMENT: HCPCS | Performed by: FAMILY MEDICINE

## 2023-08-01 PROCEDURE — 4004F PT TOBACCO SCREEN RCVD TLK: CPT | Performed by: FAMILY MEDICINE

## 2023-08-01 PROCEDURE — 1123F ACP DISCUSS/DSCN MKR DOCD: CPT | Performed by: FAMILY MEDICINE

## 2023-08-01 PROCEDURE — 1090F PRES/ABSN URINE INCON ASSESS: CPT | Performed by: FAMILY MEDICINE

## 2023-08-01 PROCEDURE — 99213 OFFICE O/P EST LOW 20 MIN: CPT | Performed by: FAMILY MEDICINE

## 2023-08-01 PROCEDURE — G8420 CALC BMI NORM PARAMETERS: HCPCS | Performed by: FAMILY MEDICINE

## 2023-08-01 RX ORDER — AMOXICILLIN 500 MG
CAPSULE ORAL
COMMUNITY
Start: 2013-08-30

## 2023-08-01 RX ORDER — ASPIRIN 81 MG/1
81 TABLET ORAL DAILY
COMMUNITY

## 2023-08-01 SDOH — ECONOMIC STABILITY: INCOME INSECURITY: HOW HARD IS IT FOR YOU TO PAY FOR THE VERY BASICS LIKE FOOD, HOUSING, MEDICAL CARE, AND HEATING?: NOT HARD AT ALL

## 2023-08-01 SDOH — ECONOMIC STABILITY: FOOD INSECURITY: WITHIN THE PAST 12 MONTHS, YOU WORRIED THAT YOUR FOOD WOULD RUN OUT BEFORE YOU GOT MONEY TO BUY MORE.: NEVER TRUE

## 2023-08-01 SDOH — ECONOMIC STABILITY: HOUSING INSECURITY
IN THE LAST 12 MONTHS, WAS THERE A TIME WHEN YOU DID NOT HAVE A STEADY PLACE TO SLEEP OR SLEPT IN A SHELTER (INCLUDING NOW)?: NO

## 2023-08-01 SDOH — ECONOMIC STABILITY: FOOD INSECURITY: WITHIN THE PAST 12 MONTHS, THE FOOD YOU BOUGHT JUST DIDN'T LAST AND YOU DIDN'T HAVE MONEY TO GET MORE.: NEVER TRUE

## 2023-08-01 ASSESSMENT — PATIENT HEALTH QUESTIONNAIRE - PHQ9
SUM OF ALL RESPONSES TO PHQ QUESTIONS 1-9: 0
1. LITTLE INTEREST OR PLEASURE IN DOING THINGS: 0
SUM OF ALL RESPONSES TO PHQ QUESTIONS 1-9: 0
SUM OF ALL RESPONSES TO PHQ9 QUESTIONS 1 & 2: 0
SUM OF ALL RESPONSES TO PHQ QUESTIONS 1-9: 0
SUM OF ALL RESPONSES TO PHQ QUESTIONS 1-9: 0
2. FEELING DOWN, DEPRESSED OR HOPELESS: 0

## 2023-08-01 ASSESSMENT — ENCOUNTER SYMPTOMS: BACK PAIN: 1

## 2023-08-07 DIAGNOSIS — M16.12 PRIMARY OSTEOARTHRITIS OF LEFT HIP: Primary | ICD-10-CM

## 2023-08-21 DIAGNOSIS — M25.552 LEFT HIP PAIN: ICD-10-CM

## 2023-08-21 DIAGNOSIS — M54.50 CHRONIC BILATERAL LOW BACK PAIN WITHOUT SCIATICA: Primary | ICD-10-CM

## 2023-08-21 DIAGNOSIS — G89.29 CHRONIC BILATERAL LOW BACK PAIN WITHOUT SCIATICA: Primary | ICD-10-CM

## 2023-09-29 ENCOUNTER — NURSE ONLY (OUTPATIENT)
Dept: FAMILY MEDICINE CLINIC | Age: 68
End: 2023-09-29

## 2023-09-29 DIAGNOSIS — Z23 NEEDS FLU SHOT: Primary | ICD-10-CM

## 2023-09-29 PROCEDURE — G0008 ADMIN INFLUENZA VIRUS VAC: HCPCS | Performed by: FAMILY MEDICINE

## 2023-09-29 PROCEDURE — 90694 VACC AIIV4 NO PRSRV 0.5ML IM: CPT | Performed by: FAMILY MEDICINE

## 2023-09-29 NOTE — PATIENT INSTRUCTIONS

## 2023-10-09 RX ORDER — ATENOLOL 50 MG/1
50 TABLET ORAL 2 TIMES DAILY
Qty: 180 TABLET | Refills: 0 | Status: SHIPPED | OUTPATIENT
Start: 2023-10-09

## 2023-11-15 ENCOUNTER — OFFICE VISIT (OUTPATIENT)
Dept: FAMILY MEDICINE CLINIC | Age: 68
End: 2023-11-15
Payer: MEDICARE

## 2023-11-15 VITALS
RESPIRATION RATE: 16 BRPM | SYSTOLIC BLOOD PRESSURE: 159 MMHG | OXYGEN SATURATION: 93 % | TEMPERATURE: 98.7 F | HEART RATE: 79 BPM | DIASTOLIC BLOOD PRESSURE: 98 MMHG

## 2023-11-15 DIAGNOSIS — R05.1 ACUTE COUGH: Primary | ICD-10-CM

## 2023-11-15 DIAGNOSIS — J10.1 INFLUENZA B: ICD-10-CM

## 2023-11-15 DIAGNOSIS — J11.1 INFLUENZAL BRONCHITIS: ICD-10-CM

## 2023-11-15 LAB
EXP DATE SOLUTION: NORMAL
EXP DATE SWAB: NORMAL
EXPIRATION DATE: NORMAL
INFLUENZA A ANTIGEN, POC: NEGATIVE
INFLUENZA B ANTIGEN, POC: POSITIVE
LOT NUMBER POC: NORMAL
LOT NUMBER SOLUTION: NORMAL
LOT NUMBER SWAB: NORMAL
SARS-COV-2 RNA, POC: NEGATIVE
VALID INTERNAL CONTROL, POC: YES

## 2023-11-15 PROCEDURE — 87502 INFLUENZA DNA AMP PROBE: CPT | Performed by: FAMILY MEDICINE

## 2023-11-15 PROCEDURE — G8484 FLU IMMUNIZE NO ADMIN: HCPCS | Performed by: FAMILY MEDICINE

## 2023-11-15 PROCEDURE — 4004F PT TOBACCO SCREEN RCVD TLK: CPT | Performed by: FAMILY MEDICINE

## 2023-11-15 PROCEDURE — G8428 CUR MEDS NOT DOCUMENT: HCPCS | Performed by: FAMILY MEDICINE

## 2023-11-15 PROCEDURE — G8399 PT W/DXA RESULTS DOCUMENT: HCPCS | Performed by: FAMILY MEDICINE

## 2023-11-15 PROCEDURE — 99213 OFFICE O/P EST LOW 20 MIN: CPT | Performed by: FAMILY MEDICINE

## 2023-11-15 PROCEDURE — 1123F ACP DISCUSS/DSCN MKR DOCD: CPT | Performed by: FAMILY MEDICINE

## 2023-11-15 PROCEDURE — 3017F COLORECTAL CA SCREEN DOC REV: CPT | Performed by: FAMILY MEDICINE

## 2023-11-15 PROCEDURE — G8420 CALC BMI NORM PARAMETERS: HCPCS | Performed by: FAMILY MEDICINE

## 2023-11-15 PROCEDURE — 1090F PRES/ABSN URINE INCON ASSESS: CPT | Performed by: FAMILY MEDICINE

## 2023-11-15 PROCEDURE — 87635 SARS-COV-2 COVID-19 AMP PRB: CPT | Performed by: FAMILY MEDICINE

## 2023-11-15 RX ORDER — OSELTAMIVIR PHOSPHATE 75 MG/1
75 CAPSULE ORAL 2 TIMES DAILY
Qty: 10 CAPSULE | Refills: 0 | Status: SHIPPED | OUTPATIENT
Start: 2023-11-15 | End: 2023-11-20

## 2023-11-15 RX ORDER — ALBUTEROL SULFATE 90 UG/1
2 AEROSOL, METERED RESPIRATORY (INHALATION) 4 TIMES DAILY PRN
Qty: 54 G | Refills: 1 | Status: SHIPPED | OUTPATIENT
Start: 2023-11-15

## 2023-11-15 RX ORDER — GUAIFENESIN AND CODEINE PHOSPHATE 100; 10 MG/5ML; MG/5ML
5 SOLUTION ORAL 3 TIMES DAILY PRN
Qty: 118 ML | Refills: 0 | Status: SHIPPED | OUTPATIENT
Start: 2023-11-15 | End: 2023-11-23

## 2023-11-15 ASSESSMENT — PATIENT HEALTH QUESTIONNAIRE - PHQ9
2. FEELING DOWN, DEPRESSED OR HOPELESS: 0
SUM OF ALL RESPONSES TO PHQ QUESTIONS 1-9: 0
SUM OF ALL RESPONSES TO PHQ QUESTIONS 1-9: 0
SUM OF ALL RESPONSES TO PHQ9 QUESTIONS 1 & 2: 0
SUM OF ALL RESPONSES TO PHQ QUESTIONS 1-9: 0
SUM OF ALL RESPONSES TO PHQ QUESTIONS 1-9: 0
1. LITTLE INTEREST OR PLEASURE IN DOING THINGS: 0

## 2023-11-20 DIAGNOSIS — R05.1 ACUTE COUGH: ICD-10-CM

## 2023-11-20 DIAGNOSIS — J10.1 INFLUENZA B: Primary | ICD-10-CM

## 2023-11-20 DIAGNOSIS — J10.1 INFLUENZA B: ICD-10-CM

## 2023-11-20 PROCEDURE — 71046 X-RAY EXAM CHEST 2 VIEWS: CPT | Performed by: FAMILY MEDICINE

## 2023-11-26 RX ORDER — PAROXETINE HYDROCHLORIDE 20 MG/1
20 TABLET, FILM COATED ORAL DAILY
Qty: 90 TABLET | Refills: 0 | Status: SHIPPED | OUTPATIENT
Start: 2023-11-26

## 2023-12-04 RX ORDER — PAROXETINE HYDROCHLORIDE 20 MG/1
20 TABLET, FILM COATED ORAL DAILY
Qty: 90 TABLET | Refills: 0 | OUTPATIENT
Start: 2023-12-04

## 2024-02-26 RX ORDER — ATENOLOL 50 MG/1
50 TABLET ORAL 2 TIMES DAILY
Qty: 180 TABLET | Refills: 0 | OUTPATIENT
Start: 2024-02-26

## 2024-02-26 RX ORDER — FAMOTIDINE 20 MG/1
20 TABLET, FILM COATED ORAL 2 TIMES DAILY
Qty: 180 TABLET | Refills: 0 | OUTPATIENT
Start: 2024-02-26

## 2024-02-26 NOTE — TELEPHONE ENCOUNTER
Refill:    Atenolol 50mg tablets  Famotidine 20mg tablets    Send to:    [facility]

## 2024-03-04 ENCOUNTER — OFFICE VISIT (OUTPATIENT)
Dept: FAMILY MEDICINE CLINIC | Age: 69
End: 2024-03-04
Payer: COMMERCIAL

## 2024-03-04 VITALS
TEMPERATURE: 97.7 F | OXYGEN SATURATION: 96 % | WEIGHT: 155 LBS | HEIGHT: 70 IN | DIASTOLIC BLOOD PRESSURE: 82 MMHG | SYSTOLIC BLOOD PRESSURE: 132 MMHG | BODY MASS INDEX: 22.19 KG/M2 | RESPIRATION RATE: 16 BRPM | HEART RATE: 82 BPM

## 2024-03-04 DIAGNOSIS — F41.9 ANXIETY AND DEPRESSION: ICD-10-CM

## 2024-03-04 DIAGNOSIS — K21.9 GASTROESOPHAGEAL REFLUX DISEASE, UNSPECIFIED WHETHER ESOPHAGITIS PRESENT: ICD-10-CM

## 2024-03-04 DIAGNOSIS — F32.A ANXIETY AND DEPRESSION: ICD-10-CM

## 2024-03-04 DIAGNOSIS — R73.09 ELEVATED GLUCOSE: ICD-10-CM

## 2024-03-04 DIAGNOSIS — E78.5 HYPERLIPIDEMIA, UNSPECIFIED HYPERLIPIDEMIA TYPE: Primary | ICD-10-CM

## 2024-03-04 DIAGNOSIS — I10 PRIMARY HYPERTENSION: ICD-10-CM

## 2024-03-04 DIAGNOSIS — I48.0 PAROXYSMAL ATRIAL FIBRILLATION (HCC): ICD-10-CM

## 2024-03-04 DIAGNOSIS — Z23 IMMUNIZATION DUE: ICD-10-CM

## 2024-03-04 PROCEDURE — 3079F DIAST BP 80-89 MM HG: CPT | Performed by: FAMILY MEDICINE

## 2024-03-04 PROCEDURE — G8484 FLU IMMUNIZE NO ADMIN: HCPCS | Performed by: FAMILY MEDICINE

## 2024-03-04 PROCEDURE — G8399 PT W/DXA RESULTS DOCUMENT: HCPCS | Performed by: FAMILY MEDICINE

## 2024-03-04 PROCEDURE — 99214 OFFICE O/P EST MOD 30 MIN: CPT | Performed by: FAMILY MEDICINE

## 2024-03-04 PROCEDURE — 90715 TDAP VACCINE 7 YRS/> IM: CPT | Performed by: FAMILY MEDICINE

## 2024-03-04 PROCEDURE — 36415 COLL VENOUS BLD VENIPUNCTURE: CPT | Performed by: FAMILY MEDICINE

## 2024-03-04 PROCEDURE — 1123F ACP DISCUSS/DSCN MKR DOCD: CPT | Performed by: FAMILY MEDICINE

## 2024-03-04 PROCEDURE — 90471 IMMUNIZATION ADMIN: CPT | Performed by: FAMILY MEDICINE

## 2024-03-04 PROCEDURE — G8420 CALC BMI NORM PARAMETERS: HCPCS | Performed by: FAMILY MEDICINE

## 2024-03-04 PROCEDURE — 3017F COLORECTAL CA SCREEN DOC REV: CPT | Performed by: FAMILY MEDICINE

## 2024-03-04 PROCEDURE — 3075F SYST BP GE 130 - 139MM HG: CPT | Performed by: FAMILY MEDICINE

## 2024-03-04 PROCEDURE — 90472 IMMUNIZATION ADMIN EACH ADD: CPT | Performed by: FAMILY MEDICINE

## 2024-03-04 PROCEDURE — 4004F PT TOBACCO SCREEN RCVD TLK: CPT | Performed by: FAMILY MEDICINE

## 2024-03-04 PROCEDURE — G8427 DOCREV CUR MEDS BY ELIG CLIN: HCPCS | Performed by: FAMILY MEDICINE

## 2024-03-04 PROCEDURE — 1090F PRES/ABSN URINE INCON ASSESS: CPT | Performed by: FAMILY MEDICINE

## 2024-03-04 PROCEDURE — 90746 HEPB VACCINE 3 DOSE ADULT IM: CPT | Performed by: FAMILY MEDICINE

## 2024-03-04 RX ORDER — FAMOTIDINE 20 MG/1
20 TABLET, FILM COATED ORAL 2 TIMES DAILY
Qty: 180 TABLET | Refills: 1 | Status: SHIPPED | OUTPATIENT
Start: 2024-03-04

## 2024-03-04 RX ORDER — ATENOLOL 50 MG/1
50 TABLET ORAL 2 TIMES DAILY
Qty: 180 TABLET | Refills: 1 | Status: SHIPPED | OUTPATIENT
Start: 2024-03-04

## 2024-03-04 RX ORDER — PAROXETINE HYDROCHLORIDE 20 MG/1
20 TABLET, FILM COATED ORAL DAILY
Qty: 90 TABLET | Refills: 0 | Status: SHIPPED | OUTPATIENT
Start: 2024-03-04

## 2024-03-04 RX ORDER — ATORVASTATIN CALCIUM 10 MG/1
10 TABLET, FILM COATED ORAL DAILY
Qty: 90 TABLET | Refills: 1 | Status: SHIPPED | OUTPATIENT
Start: 2024-03-04

## 2024-03-04 ASSESSMENT — ENCOUNTER SYMPTOMS
COUGH: 0
SHORTNESS OF BREATH: 0
GASTROINTESTINAL NEGATIVE: 1
WHEEZING: 0

## 2024-03-04 ASSESSMENT — LIFESTYLE VARIABLES
HOW OFTEN DURING THE LAST YEAR HAVE YOU FOUND THAT YOU WERE NOT ABLE TO STOP DRINKING ONCE YOU HAD STARTED: 0
HOW OFTEN DURING THE LAST YEAR HAVE YOU FAILED TO DO WHAT WAS NORMALLY EXPECTED FROM YOU BECAUSE OF DRINKING: 0
HOW MANY STANDARD DRINKS CONTAINING ALCOHOL DO YOU HAVE ON A TYPICAL DAY: 1 OR 2
HOW OFTEN DO YOU HAVE A DRINK CONTAINING ALCOHOL: 4 OR MORE TIMES A WEEK
HAVE YOU OR SOMEONE ELSE BEEN INJURED AS A RESULT OF YOUR DRINKING: 0
HOW OFTEN DURING THE LAST YEAR HAVE YOU NEEDED AN ALCOHOLIC DRINK FIRST THING IN THE MORNING TO GET YOURSELF GOING AFTER A NIGHT OF HEAVY DRINKING: 0
HOW OFTEN DURING THE LAST YEAR HAVE YOU BEEN UNABLE TO REMEMBER WHAT HAPPENED THE NIGHT BEFORE BECAUSE YOU HAD BEEN DRINKING: 0
HOW OFTEN DURING THE LAST YEAR HAVE YOU HAD A FEELING OF GUILT OR REMORSE AFTER DRINKING: 0
HAS A RELATIVE, FRIEND, DOCTOR, OR ANOTHER HEALTH PROFESSIONAL EXPRESSED CONCERN ABOUT YOUR DRINKING OR SUGGESTED YOU CUT DOWN: 0

## 2024-03-04 ASSESSMENT — PATIENT HEALTH QUESTIONNAIRE - PHQ9
SUM OF ALL RESPONSES TO PHQ QUESTIONS 1-9: 0
1. LITTLE INTEREST OR PLEASURE IN DOING THINGS: 0
SUM OF ALL RESPONSES TO PHQ QUESTIONS 1-9: 0
2. FEELING DOWN, DEPRESSED OR HOPELESS: 0
SUM OF ALL RESPONSES TO PHQ QUESTIONS 1-9: 0
SUM OF ALL RESPONSES TO PHQ9 QUESTIONS 1 & 2: 0
SUM OF ALL RESPONSES TO PHQ QUESTIONS 1-9: 0

## 2024-03-04 NOTE — PATIENT INSTRUCTIONS
calcium and [phone] IU of vitamin D per day. It is possible to meet your calcium requirement with diet alone, but a vitamin D supplement is usually necessary to meet this goal.  When exposed to the sun, use a sunscreen that protects against both UVA and UVB radiation with an SPF of 30 or greater. Reapply every 2 to 3 hours or after sweating, drying off with a towel, or swimming.  Always wear a seat belt when traveling in a car. Always wear a helmet when riding a bicycle or motorcycle.

## 2024-03-04 NOTE — PROGRESS NOTES
Gaby Vick is a 68 y.o. female who presents to the office today with the following:  Chief Complaint   Patient presents with    Medication Refill       Medication Refill  Pertinent negatives include no chest pain, congestion, coughing or headaches.     Pt not sure re Hx of PAF  Never formally diagnosed  Not taking ASA now  If she has Palpitations, last less than 30 sec  Can always feel it  Had neg Holter 10 y ago  Has not seen Card lately    Hyperlipidemia  On Lipitor taking it about 5x a week  Fasting for BW    HTN and occ SVT  On Atenolol    Anxiety and Depression  On Paxil and doing ok    GERD  Taking Pepcid bid and needs it    Review of Systems   Constitutional:  Negative for unexpected weight change.   HENT:  Negative for congestion.    Respiratory:  Negative for cough, shortness of breath and wheezing.    Cardiovascular:  Positive for palpitations (always random). Negative for chest pain.   Gastrointestinal: Negative.    Genitourinary: Negative.    Neurological:  Negative for dizziness and headaches.   Psychiatric/Behavioral:  Negative for dysphoric mood and suicidal ideas. The patient is not nervous/anxious.        See HPI.    Past Medical History:   Diagnosis Date    Anxiety     Cataract     GERD (gastroesophageal reflux disease)     Glaucoma     open angle    HCVD (hypertensive cardiovascular disease)     Herpes labialis     Ill-defined condition     C3-6 cervical stenosis    Menopause     Mixed hyperlipidemia     Osteoarthritis     hips    Osteoporosis 2015    RBBB (right bundle branch block)     Renal stones     SCC (squamous cell carcinoma) 2022    on leg    SVT (supraventricular tachycardia)        Past Surgical History:   Procedure Laterality Date    BLADDER REPAIR      BREAST BIOPSY Left     BENIGN     SECTION      COLONOSCOPY      q5-7 y    CYST INCISION AND DRAINAGE      left breast, benign    HYSTERECTOMY (CERVIX STATUS UNKNOWN)      OVARY REMOVAL         Allergies   Allergen

## 2024-03-04 NOTE — PROGRESS NOTES
Successful venipuncture in patient's right forearm X 1 sticks.  The patient tolerated this procedure w/o c/o pain or discomfort.    Patient was administered Hep B shot in left deltoid and Tdap in the Right deltoid via IM.  Patient tolerated Hep B and Tdap shots well.  Medication information reviewed with patient, patient states understanding. Patient to resume routine medications at home.  Patient given copy of AVS and VIIS with medication information and instructions for home. VIIS reviewed with patient and patient states understanding.

## 2024-03-05 LAB
ALBUMIN SERPL-MCNC: 4 G/DL (ref 3.5–5)
ALBUMIN/GLOB SERPL: 1.2 (ref 1.1–2.2)
ALP SERPL-CCNC: 112 U/L (ref 45–117)
ALT SERPL-CCNC: 27 U/L (ref 12–78)
ANION GAP SERPL CALC-SCNC: 7 MMOL/L (ref 5–15)
AST SERPL-CCNC: 21 U/L (ref 15–37)
BASOPHILS # BLD: 0.1 K/UL (ref 0–0.1)
BASOPHILS NFR BLD: 2 % (ref 0–1)
BILIRUB SERPL-MCNC: 0.6 MG/DL (ref 0.2–1)
BUN SERPL-MCNC: 10 MG/DL (ref 6–20)
BUN/CREAT SERPL: 13 (ref 12–20)
CALCIUM SERPL-MCNC: 9.8 MG/DL (ref 8.5–10.1)
CHLORIDE SERPL-SCNC: 106 MMOL/L (ref 97–108)
CHOLEST SERPL-MCNC: 178 MG/DL
CO2 SERPL-SCNC: 26 MMOL/L (ref 21–32)
CREAT SERPL-MCNC: 0.79 MG/DL (ref 0.55–1.02)
DIFFERENTIAL METHOD BLD: ABNORMAL
EOSINOPHIL # BLD: 0.3 K/UL (ref 0–0.4)
EOSINOPHIL NFR BLD: 4 % (ref 0–7)
ERYTHROCYTE [DISTWIDTH] IN BLOOD BY AUTOMATED COUNT: 12.1 % (ref 11.5–14.5)
GLOBULIN SER CALC-MCNC: 3.3 G/DL (ref 2–4)
GLUCOSE SERPL-MCNC: 122 MG/DL (ref 65–100)
HCT VFR BLD AUTO: 38.8 % (ref 35–47)
HDLC SERPL-MCNC: 67 MG/DL
HDLC SERPL: 2.7 (ref 0–5)
HGB BLD-MCNC: 13.5 G/DL (ref 11.5–16)
IMM GRANULOCYTES # BLD AUTO: 0 K/UL (ref 0–0.04)
IMM GRANULOCYTES NFR BLD AUTO: 0 % (ref 0–0.5)
LDLC SERPL CALC-MCNC: 82.2 MG/DL (ref 0–100)
LYMPHOCYTES # BLD: 2.1 K/UL (ref 0.8–3.5)
LYMPHOCYTES NFR BLD: 25 % (ref 12–49)
MCH RBC QN AUTO: 32.7 PG (ref 26–34)
MCHC RBC AUTO-ENTMCNC: 34.8 G/DL (ref 30–36.5)
MCV RBC AUTO: 93.9 FL (ref 80–99)
MONOCYTES # BLD: 0.6 K/UL (ref 0–1)
MONOCYTES NFR BLD: 7 % (ref 5–13)
NEUTS SEG # BLD: 5.1 K/UL (ref 1.8–8)
NEUTS SEG NFR BLD: 62 % (ref 32–75)
NRBC # BLD: 0 K/UL (ref 0–0.01)
NRBC BLD-RTO: 0 PER 100 WBC
PLATELET # BLD AUTO: 377 K/UL (ref 150–400)
PMV BLD AUTO: 10.1 FL (ref 8.9–12.9)
POTASSIUM SERPL-SCNC: 3.8 MMOL/L (ref 3.5–5.1)
PROT SERPL-MCNC: 7.3 G/DL (ref 6.4–8.2)
RBC # BLD AUTO: 4.13 M/UL (ref 3.8–5.2)
SODIUM SERPL-SCNC: 139 MMOL/L (ref 136–145)
TRIGL SERPL-MCNC: 144 MG/DL
VLDLC SERPL CALC-MCNC: 28.8 MG/DL
WBC # BLD AUTO: 8.2 K/UL (ref 3.6–11)

## 2024-03-07 ENCOUNTER — CLINICAL DOCUMENTATION (OUTPATIENT)
Dept: FAMILY MEDICINE CLINIC | Age: 69
End: 2024-03-07

## 2024-03-07 LAB
EST. AVERAGE GLUCOSE BLD GHB EST-MCNC: 103 MG/DL
HBA1C MFR BLD: 5.2 % (ref 4–5.6)

## 2024-03-15 ENCOUNTER — PROCEDURE VISIT (OUTPATIENT)
Dept: FAMILY MEDICINE CLINIC | Age: 69
End: 2024-03-15

## 2024-03-15 VITALS
SYSTOLIC BLOOD PRESSURE: 132 MMHG | WEIGHT: 155 LBS | HEIGHT: 70 IN | HEART RATE: 80 BPM | TEMPERATURE: 96.9 F | BODY MASS INDEX: 22.19 KG/M2 | RESPIRATION RATE: 16 BRPM | DIASTOLIC BLOOD PRESSURE: 82 MMHG | OXYGEN SATURATION: 97 %

## 2024-03-15 DIAGNOSIS — M79.674 GREAT TOE PAIN, RIGHT: ICD-10-CM

## 2024-03-15 DIAGNOSIS — L57.0 ACTINIC KERATOSIS: Primary | ICD-10-CM

## 2024-03-15 RX ORDER — ASPIRIN 81 MG/1
81 TABLET ORAL DAILY
COMMUNITY

## 2024-03-15 ASSESSMENT — PATIENT HEALTH QUESTIONNAIRE - PHQ9
SUM OF ALL RESPONSES TO PHQ QUESTIONS 1-9: 0
SUM OF ALL RESPONSES TO PHQ QUESTIONS 1-9: 0
SUM OF ALL RESPONSES TO PHQ9 QUESTIONS 1 & 2: 0
SUM OF ALL RESPONSES TO PHQ QUESTIONS 1-9: 0
2. FEELING DOWN, DEPRESSED OR HOPELESS: 0
SUM OF ALL RESPONSES TO PHQ QUESTIONS 1-9: 0
1. LITTLE INTEREST OR PLEASURE IN DOING THINGS: 0

## 2024-03-15 NOTE — PROGRESS NOTES
°C) (Oral)   Resp 16   Ht 1.778 m (5' 10\")   Wt 70.3 kg (155 lb)   SpO2 97%   BMI 22.24 kg/m²    General:  Alert, cooperative, no distress.   Extremities: Extremities normal, atraumatic, no cyanosis or edema. Mycotic great toenails   Pulses: 2+ and symmetric all extremities.   Skin: Skin color, texture, turgor normal. AK on L antihelix   Lymph nodes: Cervical, supraclavicular, and axillary nodes normal.   Neurologic: CNII-XII intact. Normal strength, sensation, and reflexes throughout.         ASSESSMENT AND PLAN    1. Actinic keratosis  Frozen x 30 sec  - [facility] DESTRUC BENIGN/PREMAL,FIRST LESION [[id number]]    2. Great toe pain, right  This does not appear to be paronychia or ingrown nail  - Amb External Referral To Podiatry              Orders Placed This Encounter   Procedures    Amb External Referral To Podiatry     Referral Priority:   Routine     Referral Type:   Eval and Treat     Referral Reason:   Specialty Services Required     Referred to Provider:   Lalit Marinelli DPM     Requested Specialty:   Podiatry     Number of Visits Requested:   1    [facility] DESTRUC BENIGN/PREMAL,FIRST LESION [[id number]]           Lalit Braun MD

## 2024-03-25 RX ORDER — VALACYCLOVIR HYDROCHLORIDE 1 G/1
1000 TABLET, FILM COATED ORAL 2 TIMES DAILY
COMMUNITY
End: 2024-03-25 | Stop reason: SDUPTHER

## 2024-03-25 RX ORDER — VALACYCLOVIR HYDROCHLORIDE 1 G/1
TABLET, FILM COATED ORAL
Qty: 20 TABLET | Refills: 0 | Status: SHIPPED | OUTPATIENT
Start: 2024-03-25

## 2024-03-25 NOTE — TELEPHONE ENCOUNTER
Requested Prescriptions     Pending Prescriptions Disp Refills    valACYclovir (VALTREX) 1 g tablet 60 tablet 5     Sig: Take 1 tablet by mouth 2 times daily     Last seen:  3/7/24

## 2024-12-19 DIAGNOSIS — I10 PRIMARY HYPERTENSION: ICD-10-CM

## 2024-12-19 DIAGNOSIS — F32.A ANXIETY AND DEPRESSION: ICD-10-CM

## 2024-12-19 DIAGNOSIS — K21.9 GASTROESOPHAGEAL REFLUX DISEASE, UNSPECIFIED WHETHER ESOPHAGITIS PRESENT: ICD-10-CM

## 2024-12-19 DIAGNOSIS — F41.9 ANXIETY AND DEPRESSION: ICD-10-CM

## 2024-12-19 RX ORDER — ATENOLOL 50 MG/1
50 TABLET ORAL 2 TIMES DAILY
Qty: 180 TABLET | Refills: 0 | Status: SHIPPED | OUTPATIENT
Start: 2024-12-19

## 2024-12-19 RX ORDER — PAROXETINE 20 MG/1
20 TABLET, FILM COATED ORAL DAILY
Qty: 90 TABLET | Refills: 0 | Status: SHIPPED | OUTPATIENT
Start: 2024-12-19

## 2024-12-19 RX ORDER — FAMOTIDINE 20 MG/1
20 TABLET, FILM COATED ORAL 2 TIMES DAILY
Qty: 180 TABLET | Refills: 0 | Status: SHIPPED | OUTPATIENT
Start: 2024-12-19

## 2024-12-19 NOTE — TELEPHONE ENCOUNTER
Requested Prescriptions     Pending Prescriptions Disp Refills    atenolol (TENORMIN) 50 MG tablet 180 tablet 1     Sig: Take 1 tablet by mouth 2 times daily    famotidine (PEPCID) 20 MG tablet 180 tablet 1     Sig: Take 1 tablet by mouth 2 times daily    PARoxetine (PAXIL) 20 MG tablet 90 tablet 0     Sig: Take 1 tablet by mouth daily     Last seen:  3/4/24

## 2025-01-19 ENCOUNTER — TELEPHONE (OUTPATIENT)
Dept: FAMILY MEDICINE CLINIC | Age: 70
End: 2025-01-19

## 2025-01-19 DIAGNOSIS — R05.1 ACUTE COUGH: Primary | ICD-10-CM

## 2025-01-20 DIAGNOSIS — R05.1 ACUTE COUGH: ICD-10-CM

## 2025-01-20 PROCEDURE — 71046 X-RAY EXAM CHEST 2 VIEWS: CPT | Performed by: FAMILY MEDICINE

## 2025-01-21 DIAGNOSIS — B00.1 HERPES LABIALIS: Primary | ICD-10-CM

## 2025-01-21 RX ORDER — VALACYCLOVIR HYDROCHLORIDE 1 G/1
TABLET, FILM COATED ORAL
Qty: 20 TABLET | Refills: 0 | Status: SHIPPED | OUTPATIENT
Start: 2025-01-21

## 2025-03-14 DIAGNOSIS — B00.1 HERPES LABIALIS: ICD-10-CM

## 2025-03-14 RX ORDER — VALACYCLOVIR HYDROCHLORIDE 1 G/1
TABLET, FILM COATED ORAL
Qty: 20 TABLET | Refills: 0 | Status: SHIPPED | OUTPATIENT
Start: 2025-03-14

## 2025-05-01 ENCOUNTER — LAB (OUTPATIENT)
Dept: FAMILY MEDICINE CLINIC | Age: 70
End: 2025-05-01
Payer: MEDICARE

## 2025-05-01 ENCOUNTER — TELEPHONE (OUTPATIENT)
Dept: FAMILY MEDICINE CLINIC | Age: 70
End: 2025-05-01

## 2025-05-01 DIAGNOSIS — R30.0 DYSURIA: ICD-10-CM

## 2025-05-01 DIAGNOSIS — R30.0 DYSURIA: Primary | ICD-10-CM

## 2025-05-01 LAB
BILIRUBIN, URINE, POC: NORMAL
BLOOD URINE, POC: NORMAL
GLUCOSE URINE, POC: NEGATIVE
KETONES, URINE, POC: NORMAL
LEUKOCYTE ESTERASE, URINE, POC: NORMAL
NITRITE, URINE, POC: NEGATIVE
PH, URINE, POC: 6 (ref 4.6–8)
PROTEIN,URINE, POC: NORMAL
SPECIFIC GRAVITY, URINE, POC: 1.02 (ref 1–1.03)
URINALYSIS CLARITY, POC: NORMAL
URINALYSIS COLOR, POC: NORMAL
UROBILINOGEN, POC: NORMAL

## 2025-05-01 PROCEDURE — 81003 URINALYSIS AUTO W/O SCOPE: CPT

## 2025-05-01 RX ORDER — NITROFURANTOIN 25; 75 MG/1; MG/1
100 CAPSULE ORAL 2 TIMES DAILY
Qty: 14 CAPSULE | Refills: 0 | Status: SHIPPED | OUTPATIENT
Start: 2025-05-01 | End: 2025-05-08

## 2025-05-04 LAB
BACTERIA SPEC CULT: ABNORMAL
CC UR VC: ABNORMAL
SERVICE CMNT-IMP: ABNORMAL

## 2025-05-08 ENCOUNTER — RESULTS FOLLOW-UP (OUTPATIENT)
Dept: FAMILY MEDICINE CLINIC | Age: 70
End: 2025-05-08

## 2025-05-12 DIAGNOSIS — N30.90 CYSTITIS: Primary | ICD-10-CM

## 2025-05-12 RX ORDER — SULFAMETHOXAZOLE AND TRIMETHOPRIM 800; 160 MG/1; MG/1
1 TABLET ORAL 2 TIMES DAILY
Qty: 14 TABLET | Refills: 0 | Status: SHIPPED | OUTPATIENT
Start: 2025-05-12 | End: 2025-05-19

## 2025-05-12 NOTE — PROGRESS NOTES
Pt still not feeling better with Cystitis  Was on Macrobid  Still not better  Last Cx grew Klebsiella  Sensitive to Bactrim

## 2025-05-29 DIAGNOSIS — I10 PRIMARY HYPERTENSION: ICD-10-CM

## 2025-05-29 RX ORDER — ATENOLOL 50 MG/1
50 TABLET ORAL 2 TIMES DAILY
Qty: 60 TABLET | Refills: 0 | Status: SHIPPED | OUTPATIENT
Start: 2025-05-29

## 2025-05-29 NOTE — TELEPHONE ENCOUNTER
CMA attempted to contact patient again before leaving the office for the weekend.     Patient unavailable. LMOM to have patient return call during office hours on 2/11/19.    CMA returned call to patient. Patient unavailable. LMOM to return call to clinic.    Detail Level: Detailed [facility] [employer] returned call to patient. Per Dr. Pettit, patient is able to return to work without restrictions. [facility] [employer] made patient aware that Maddie had attempted to contact him for his nutrition referral and was unsuccessful. Also told him he needs his A1C drawn before seeing Dr. Pettit. Patient verbalized understanding of this. [facility] is putting in another referral to nutrition services for the patient and he stated he would make sure he answers when they call.     Patient to schedule lab appointment for the blood work today after scheduling his fuv with Dr. Pettit.     Patient verbalized understanding of the above and had no further questions or concerns at this time.    Patient is returning a call.     Thank you   Requested Prescriptions     Pending Prescriptions Disp Refills    atenolol (TENORMIN) 50 MG tablet 180 tablet 0     Sig: Take 1 tablet by mouth 2 times daily     Last seen:  3/4/24   Shawn stopped by and is requesting a new return to work letter.   He needs to have no restrictions.  He wants to return to work Monday, 2/11/2019.  He will pick it up in Orthopedics when done.     Detail Level: Simple

## 2025-06-03 ENCOUNTER — OFFICE VISIT (OUTPATIENT)
Dept: FAMILY MEDICINE CLINIC | Age: 70
End: 2025-06-03
Payer: MEDICARE

## 2025-06-03 VITALS
DIASTOLIC BLOOD PRESSURE: 86 MMHG | TEMPERATURE: 98.2 F | HEIGHT: 69 IN | OXYGEN SATURATION: 98 % | WEIGHT: 154.4 LBS | SYSTOLIC BLOOD PRESSURE: 136 MMHG | BODY MASS INDEX: 22.87 KG/M2 | RESPIRATION RATE: 16 BRPM | HEART RATE: 58 BPM

## 2025-06-03 DIAGNOSIS — I10 PRIMARY HYPERTENSION: Primary | ICD-10-CM

## 2025-06-03 DIAGNOSIS — M81.6 LOCALIZED OSTEOPOROSIS (LEQUESNE): ICD-10-CM

## 2025-06-03 DIAGNOSIS — F41.9 ANXIETY AND DEPRESSION: ICD-10-CM

## 2025-06-03 DIAGNOSIS — E78.5 HYPERLIPIDEMIA, UNSPECIFIED HYPERLIPIDEMIA TYPE: ICD-10-CM

## 2025-06-03 DIAGNOSIS — Z00.00 INITIAL MEDICARE ANNUAL WELLNESS VISIT: ICD-10-CM

## 2025-06-03 DIAGNOSIS — F32.A ANXIETY AND DEPRESSION: ICD-10-CM

## 2025-06-03 DIAGNOSIS — Z12.31 ENCOUNTER FOR SCREENING MAMMOGRAM FOR MALIGNANT NEOPLASM OF BREAST: ICD-10-CM

## 2025-06-03 DIAGNOSIS — M85.80 OSTEOPENIA, UNSPECIFIED LOCATION: ICD-10-CM

## 2025-06-03 PROCEDURE — 4004F PT TOBACCO SCREEN RCVD TLK: CPT | Performed by: FAMILY MEDICINE

## 2025-06-03 PROCEDURE — 1159F MED LIST DOCD IN RCRD: CPT | Performed by: FAMILY MEDICINE

## 2025-06-03 PROCEDURE — 1126F AMNT PAIN NOTED NONE PRSNT: CPT | Performed by: FAMILY MEDICINE

## 2025-06-03 PROCEDURE — 1090F PRES/ABSN URINE INCON ASSESS: CPT | Performed by: FAMILY MEDICINE

## 2025-06-03 PROCEDURE — G0438 PPPS, INITIAL VISIT: HCPCS | Performed by: FAMILY MEDICINE

## 2025-06-03 PROCEDURE — 3075F SYST BP GE 130 - 139MM HG: CPT | Performed by: FAMILY MEDICINE

## 2025-06-03 PROCEDURE — 99213 OFFICE O/P EST LOW 20 MIN: CPT | Performed by: FAMILY MEDICINE

## 2025-06-03 PROCEDURE — 3017F COLORECTAL CA SCREEN DOC REV: CPT | Performed by: FAMILY MEDICINE

## 2025-06-03 PROCEDURE — 1123F ACP DISCUSS/DSCN MKR DOCD: CPT | Performed by: FAMILY MEDICINE

## 2025-06-03 PROCEDURE — 1160F RVW MEDS BY RX/DR IN RCRD: CPT | Performed by: FAMILY MEDICINE

## 2025-06-03 PROCEDURE — 3079F DIAST BP 80-89 MM HG: CPT | Performed by: FAMILY MEDICINE

## 2025-06-03 PROCEDURE — G8399 PT W/DXA RESULTS DOCUMENT: HCPCS | Performed by: FAMILY MEDICINE

## 2025-06-03 PROCEDURE — G8427 DOCREV CUR MEDS BY ELIG CLIN: HCPCS | Performed by: FAMILY MEDICINE

## 2025-06-03 PROCEDURE — G8420 CALC BMI NORM PARAMETERS: HCPCS | Performed by: FAMILY MEDICINE

## 2025-06-03 SDOH — ECONOMIC STABILITY: FOOD INSECURITY: WITHIN THE PAST 12 MONTHS, YOU WORRIED THAT YOUR FOOD WOULD RUN OUT BEFORE YOU GOT MONEY TO BUY MORE.: NEVER TRUE

## 2025-06-03 SDOH — ECONOMIC STABILITY: FOOD INSECURITY: WITHIN THE PAST 12 MONTHS, THE FOOD YOU BOUGHT JUST DIDN'T LAST AND YOU DIDN'T HAVE MONEY TO GET MORE.: NEVER TRUE

## 2025-06-03 ASSESSMENT — LIFESTYLE VARIABLES
HOW OFTEN DO YOU HAVE A DRINK CONTAINING ALCOHOL: 4 OR MORE TIMES A WEEK
HOW OFTEN DURING THE LAST YEAR HAVE YOU HAD A FEELING OF GUILT OR REMORSE AFTER DRINKING: NEVER
HOW OFTEN DURING THE LAST YEAR HAVE YOU FAILED TO DO WHAT WAS NORMALLY EXPECTED FROM YOU BECAUSE OF DRINKING: NEVER
HAVE YOU OR SOMEONE ELSE BEEN INJURED AS A RESULT OF YOUR DRINKING: NO
HAS A RELATIVE, FRIEND, DOCTOR, OR ANOTHER HEALTH PROFESSIONAL EXPRESSED CONCERN ABOUT YOUR DRINKING OR SUGGESTED YOU CUT DOWN: NO
HOW OFTEN DURING THE LAST YEAR HAVE YOU BEEN UNABLE TO REMEMBER WHAT HAPPENED THE NIGHT BEFORE BECAUSE YOU HAD BEEN DRINKING: NEVER
HOW OFTEN DURING THE LAST YEAR HAVE YOU FOUND THAT YOU WERE NOT ABLE TO STOP DRINKING ONCE YOU HAD STARTED: NEVER
HOW OFTEN DURING THE LAST YEAR HAVE YOU NEEDED AN ALCOHOLIC DRINK FIRST THING IN THE MORNING TO GET YOURSELF GOING AFTER A NIGHT OF HEAVY DRINKING: NEVER
HOW MANY STANDARD DRINKS CONTAINING ALCOHOL DO YOU HAVE ON A TYPICAL DAY: 1 OR 2

## 2025-06-03 ASSESSMENT — ENCOUNTER SYMPTOMS
COUGH: 0
SHORTNESS OF BREATH: 0
GASTROINTESTINAL NEGATIVE: 1

## 2025-06-03 ASSESSMENT — PATIENT HEALTH QUESTIONNAIRE - PHQ9
SUM OF ALL RESPONSES TO PHQ QUESTIONS 1-9: 0
9. THOUGHTS THAT YOU WOULD BE BETTER OFF DEAD, OR OF HURTING YOURSELF: NOT AT ALL
3. TROUBLE FALLING OR STAYING ASLEEP: NOT AT ALL
1. LITTLE INTEREST OR PLEASURE IN DOING THINGS: NOT AT ALL
10. IF YOU CHECKED OFF ANY PROBLEMS, HOW DIFFICULT HAVE THESE PROBLEMS MADE IT FOR YOU TO DO YOUR WORK, TAKE CARE OF THINGS AT HOME, OR GET ALONG WITH OTHER PEOPLE: NOT DIFFICULT AT ALL
6. FEELING BAD ABOUT YOURSELF - OR THAT YOU ARE A FAILURE OR HAVE LET YOURSELF OR YOUR FAMILY DOWN: NOT AT ALL
5. POOR APPETITE OR OVEREATING: NOT AT ALL
SUM OF ALL RESPONSES TO PHQ QUESTIONS 1-9: 0
7. TROUBLE CONCENTRATING ON THINGS, SUCH AS READING THE NEWSPAPER OR WATCHING TELEVISION: NOT AT ALL
4. FEELING TIRED OR HAVING LITTLE ENERGY: NOT AT ALL
2. FEELING DOWN, DEPRESSED OR HOPELESS: NOT AT ALL
8. MOVING OR SPEAKING SO SLOWLY THAT OTHER PEOPLE COULD HAVE NOTICED. OR THE OPPOSITE, BEING SO FIGETY OR RESTLESS THAT YOU HAVE BEEN MOVING AROUND A LOT MORE THAN USUAL: NOT AT ALL
SUM OF ALL RESPONSES TO PHQ QUESTIONS 1-9: 0
SUM OF ALL RESPONSES TO PHQ QUESTIONS 1-9: 0

## 2025-06-03 NOTE — PROGRESS NOTES
Have you been to the ER, urgent care clinic since your last visit?  Hospitalized since your last visit?   NO    Have you seen or consulted any other health care providers outside our system since your last visit?   NO    Have you had a mammogram?”   NO    Date of last Mammogram: 11/2/2022           
MD Betsey   atenolol (TENORMIN) 50 MG tablet Take 1 tablet by mouth 2 times daily Yes Cinthya Cota MD   valACYclovir (VALTREX) 1 g tablet Take 2 bid for 1 d prn Yes Cinthya Cota MD   famotidine (PEPCID) 20 MG tablet Take 1 tablet by mouth 2 times daily Yes Cinthya Cota MD   PARoxetine (PAXIL) 20 MG tablet Take 1 tablet by mouth daily Yes Cinthya Cota MD   albuterol sulfate HFA (VENTOLIN HFA) 108 (90 Base) MCG/ACT inhaler Inhale 2 puffs into the lungs 4 times daily as needed for Wheezing Yes Lalit Braun MD   fluticasone (FLONASE) 50 MCG/ACT nasal spray 2 sprays by Nasal route daily Yes Automatic Reconciliation, Ar   ibuprofen (ADVIL;MOTRIN) 200 MG tablet Take by mouth every 6 hours as needed Yes Automatic Reconciliation, Ar   latanoprost (XALATAN) 0.005 % ophthalmic solution ceived the following from [facility]: Outside name: latanoprost (XALATAN) 0.005 % ophthalmic solution Yes Automatic Reconciliation, Ar   levobunolol (BETAGAN) 0.5 % ophthalmic solution ceived the following from [facility]: Outside name: levobunoloL (BETAGAN) 0.5 % ophthalmic solution Yes Automatic Reconciliation, Ar       CareTeam (Including outside providers/suppliers regularly involved in providing care):   Patient Care Team:  Cinthya Cota MD as PCP - General  Cinthya Cota MD as PCP - Empaneled Provider     Recommendations for Preventive Services Due: see orders and patient instructions/AVS.  Recommended screening schedule for the next 5-10 years is provided to the patient in written form: see Patient Instructions/AVS.     Reviewed and updated this visit:  Tobacco  Allergies  Meds  Sexual Hx

## 2025-06-04 ENCOUNTER — TELEPHONE (OUTPATIENT)
Dept: FAMILY MEDICINE CLINIC | Age: 70
End: 2025-06-04

## 2025-06-04 ENCOUNTER — RESULTS FOLLOW-UP (OUTPATIENT)
Dept: FAMILY MEDICINE CLINIC | Age: 70
End: 2025-06-04

## 2025-06-04 LAB
ALBUMIN SERPL-MCNC: 3.9 G/DL (ref 3.5–5)
ALBUMIN/GLOB SERPL: 1.3 (ref 1.1–2.2)
ALP SERPL-CCNC: 106 U/L (ref 45–117)
ALT SERPL-CCNC: 21 U/L (ref 12–78)
ANION GAP SERPL CALC-SCNC: 8 MMOL/L (ref 2–12)
AST SERPL-CCNC: 19 U/L (ref 15–37)
BASOPHILS # BLD: 0.14 K/UL (ref 0–0.1)
BASOPHILS NFR BLD: 2 % (ref 0–1)
BILIRUB SERPL-MCNC: 0.6 MG/DL (ref 0.2–1)
BUN SERPL-MCNC: 17 MG/DL (ref 6–20)
BUN/CREAT SERPL: 19 (ref 12–20)
CALCIUM SERPL-MCNC: 9.5 MG/DL (ref 8.5–10.1)
CHLORIDE SERPL-SCNC: 106 MMOL/L (ref 97–108)
CHOLEST SERPL-MCNC: 183 MG/DL
CO2 SERPL-SCNC: 27 MMOL/L (ref 21–32)
CREAT SERPL-MCNC: 0.89 MG/DL (ref 0.55–1.02)
DIFFERENTIAL METHOD BLD: ABNORMAL
EOSINOPHIL # BLD: 0.62 K/UL (ref 0–0.4)
EOSINOPHIL NFR BLD: 9.1 % (ref 0–7)
ERYTHROCYTE [DISTWIDTH] IN BLOOD BY AUTOMATED COUNT: 12.2 % (ref 11.5–14.5)
GLOBULIN SER CALC-MCNC: 3.1 G/DL (ref 2–4)
GLUCOSE SERPL-MCNC: 93 MG/DL (ref 65–100)
HCT VFR BLD AUTO: 39.2 % (ref 35–47)
HDLC SERPL-MCNC: 49 MG/DL
HDLC SERPL: 3.7 (ref 0–5)
HGB BLD-MCNC: 13 G/DL (ref 11.5–16)
IMM GRANULOCYTES # BLD AUTO: 0.01 K/UL (ref 0–0.04)
IMM GRANULOCYTES NFR BLD AUTO: 0.1 % (ref 0–0.5)
LDLC SERPL CALC-MCNC: 100.4 MG/DL (ref 0–100)
LYMPHOCYTES # BLD: 2.43 K/UL (ref 0.8–3.5)
LYMPHOCYTES NFR BLD: 35.5 % (ref 12–49)
MCH RBC QN AUTO: 32.5 PG (ref 26–34)
MCHC RBC AUTO-ENTMCNC: 33.2 G/DL (ref 30–36.5)
MCV RBC AUTO: 98 FL (ref 80–99)
MONOCYTES # BLD: 0.65 K/UL (ref 0–1)
MONOCYTES NFR BLD: 9.5 % (ref 5–13)
NEUTS SEG # BLD: 2.99 K/UL (ref 1.8–8)
NEUTS SEG NFR BLD: 43.8 % (ref 32–75)
NRBC # BLD: 0 K/UL (ref 0–0.01)
NRBC BLD-RTO: 0 PER 100 WBC
PLATELET # BLD AUTO: 331 K/UL (ref 150–400)
PMV BLD AUTO: 10.4 FL (ref 8.9–12.9)
POTASSIUM SERPL-SCNC: 4.2 MMOL/L (ref 3.5–5.1)
PROT SERPL-MCNC: 7 G/DL (ref 6.4–8.2)
RBC # BLD AUTO: 4 M/UL (ref 3.8–5.2)
SODIUM SERPL-SCNC: 141 MMOL/L (ref 136–145)
TRIGL SERPL-MCNC: 168 MG/DL
VLDLC SERPL CALC-MCNC: 33.6 MG/DL
WBC # BLD AUTO: 6.8 K/UL (ref 3.6–11)

## 2025-06-04 NOTE — TELEPHONE ENCOUNTER
I have called and spoke to this patient.  Per Dr Mcpherson:   Call Gaby, she can cut back the Atenolol to one a day  And monitor her BP  The other things we can discuss on Monday  GIAN Mcpherson   Patient verbalizes understanding.

## 2025-06-04 NOTE — TELEPHONE ENCOUNTER
Patient reports that she has seen her lab results.    1)  She would like to try another statin, maybe Rosuvastatin, which she has heard does not cause as much muscle aching as others.    2)  she is also curious if she should be concerned about her elevated 'phils in her CBC    3)  Can she possibly cut her Atenolol to 1 a day since her BP was good yesterday    4)  her pulse is kind of running low, fatigue is bad, even after 6 hours of sleep last night.  She is not sure what is going on.

## 2025-08-07 DIAGNOSIS — F32.A ANXIETY AND DEPRESSION: ICD-10-CM

## 2025-08-07 DIAGNOSIS — I10 PRIMARY HYPERTENSION: ICD-10-CM

## 2025-08-07 DIAGNOSIS — F41.9 ANXIETY AND DEPRESSION: ICD-10-CM

## 2025-08-07 RX ORDER — PAROXETINE 20 MG/1
20 TABLET, FILM COATED ORAL DAILY
Qty: 90 TABLET | Refills: 1 | Status: SHIPPED | OUTPATIENT
Start: 2025-08-07

## 2025-08-07 RX ORDER — ATENOLOL 50 MG/1
50 TABLET ORAL 2 TIMES DAILY
Qty: 180 TABLET | Refills: 1 | Status: SHIPPED | OUTPATIENT
Start: 2025-08-07

## 2025-08-29 ENCOUNTER — PROCEDURE VISIT (OUTPATIENT)
Dept: FAMILY MEDICINE CLINIC | Age: 70
End: 2025-08-29

## 2025-08-29 VITALS
BODY MASS INDEX: 21.47 KG/M2 | RESPIRATION RATE: 16 BRPM | SYSTOLIC BLOOD PRESSURE: 120 MMHG | HEIGHT: 70 IN | OXYGEN SATURATION: 97 % | WEIGHT: 150 LBS | TEMPERATURE: 98.6 F | DIASTOLIC BLOOD PRESSURE: 80 MMHG | HEART RATE: 60 BPM

## 2025-08-29 DIAGNOSIS — L60.0 INGROWN TOENAIL OF RIGHT FOOT WITH INFECTION: Primary | ICD-10-CM

## 2025-08-29 DIAGNOSIS — S90.851A FOREIGN BODY IN RIGHT FOOT, INITIAL ENCOUNTER: ICD-10-CM

## 2025-08-29 RX ORDER — LIDOCAINE HYDROCHLORIDE 20 MG/ML
1 INJECTION, SOLUTION INFILTRATION; PERINEURAL ONCE
Status: SHIPPED | OUTPATIENT
Start: 2025-08-29

## 2025-08-29 RX ORDER — ATENOLOL 50 MG/1
50 TABLET ORAL DAILY
Qty: 180 TABLET | Refills: 1 | Status: SHIPPED | OUTPATIENT
Start: 2025-08-29 | End: 2025-08-29 | Stop reason: CLARIF

## 2025-08-29 RX ORDER — CEPHALEXIN 500 MG/1
500 CAPSULE ORAL 2 TIMES DAILY
Qty: 20 CAPSULE | Refills: 0 | Status: SHIPPED | OUTPATIENT
Start: 2025-08-29 | End: 2025-09-08

## 2025-08-29 ASSESSMENT — PATIENT HEALTH QUESTIONNAIRE - PHQ9
6. FEELING BAD ABOUT YOURSELF - OR THAT YOU ARE A FAILURE OR HAVE LET YOURSELF OR YOUR FAMILY DOWN: NOT AT ALL
4. FEELING TIRED OR HAVING LITTLE ENERGY: NOT AT ALL
3. TROUBLE FALLING OR STAYING ASLEEP: NOT AT ALL
1. LITTLE INTEREST OR PLEASURE IN DOING THINGS: NOT AT ALL
5. POOR APPETITE OR OVEREATING: NOT AT ALL
8. MOVING OR SPEAKING SO SLOWLY THAT OTHER PEOPLE COULD HAVE NOTICED. OR THE OPPOSITE, BEING SO FIGETY OR RESTLESS THAT YOU HAVE BEEN MOVING AROUND A LOT MORE THAN USUAL: NOT AT ALL
10. IF YOU CHECKED OFF ANY PROBLEMS, HOW DIFFICULT HAVE THESE PROBLEMS MADE IT FOR YOU TO DO YOUR WORK, TAKE CARE OF THINGS AT HOME, OR GET ALONG WITH OTHER PEOPLE: NOT DIFFICULT AT ALL
SUM OF ALL RESPONSES TO PHQ QUESTIONS 1-9: 0
SUM OF ALL RESPONSES TO PHQ QUESTIONS 1-9: 0
7. TROUBLE CONCENTRATING ON THINGS, SUCH AS READING THE NEWSPAPER OR WATCHING TELEVISION: NOT AT ALL
2. FEELING DOWN, DEPRESSED OR HOPELESS: NOT AT ALL
SUM OF ALL RESPONSES TO PHQ QUESTIONS 1-9: 0
SUM OF ALL RESPONSES TO PHQ QUESTIONS 1-9: 0
9. THOUGHTS THAT YOU WOULD BE BETTER OFF DEAD, OR OF HURTING YOURSELF: NOT AT ALL